# Patient Record
Sex: FEMALE | Race: WHITE | Employment: OTHER | URBAN - METROPOLITAN AREA
[De-identification: names, ages, dates, MRNs, and addresses within clinical notes are randomized per-mention and may not be internally consistent; named-entity substitution may affect disease eponyms.]

---

## 2017-09-12 RX ORDER — PROPRANOLOL HYDROCHLORIDE 20 MG/1
TABLET ORAL
Qty: 180 TABLET | Refills: 0 | Status: SHIPPED | OUTPATIENT
Start: 2017-09-12 | End: 2017-12-11

## 2017-09-12 NOTE — TELEPHONE ENCOUNTER
Please call Pt and advise that Pt is due for annual wellness exam. LOV 09/12/16. Routed to [facility].

## 2017-10-10 ENCOUNTER — TELEPHONE (OUTPATIENT)
Dept: FAMILY MEDICINE CLINIC | Facility: CLINIC | Age: 77
End: 2017-10-10

## 2017-10-10 NOTE — TELEPHONE ENCOUNTER
JONI w/spouse for pt to either call back or be here 15 min prior to answer her Annual Assessment Form for appt w/Dr Toñito Christine on 10/16/17 (form by daily checklist)

## 2017-10-16 ENCOUNTER — TELEPHONE (OUTPATIENT)
Dept: FAMILY MEDICINE CLINIC | Facility: CLINIC | Age: 77
End: 2017-10-16

## 2017-10-16 ENCOUNTER — APPOINTMENT (OUTPATIENT)
Dept: LAB | Age: 77
End: 2017-10-16
Attending: FAMILY MEDICINE
Payer: MEDICARE

## 2017-10-16 ENCOUNTER — OFFICE VISIT (OUTPATIENT)
Dept: FAMILY MEDICINE CLINIC | Facility: CLINIC | Age: 77
End: 2017-10-16

## 2017-10-16 VITALS
RESPIRATION RATE: 11 BRPM | DIASTOLIC BLOOD PRESSURE: 60 MMHG | SYSTOLIC BLOOD PRESSURE: 112 MMHG | HEIGHT: 64.5 IN | WEIGHT: 143 LBS | BODY MASS INDEX: 24.12 KG/M2 | HEART RATE: 84 BPM

## 2017-10-16 DIAGNOSIS — E78.00 PURE HYPERCHOLESTEROLEMIA: ICD-10-CM

## 2017-10-16 DIAGNOSIS — I34.1 MVP (MITRAL VALVE PROLAPSE): ICD-10-CM

## 2017-10-16 DIAGNOSIS — Z13.31 DEPRESSION SCREENING: ICD-10-CM

## 2017-10-16 DIAGNOSIS — C18.2 MALIGNANT NEOPLASM OF ASCENDING COLON (HCC): ICD-10-CM

## 2017-10-16 DIAGNOSIS — Z12.11 SCREEN FOR COLON CANCER: ICD-10-CM

## 2017-10-16 DIAGNOSIS — Z00.00 ENCOUNTER FOR ANNUAL HEALTH EXAMINATION: Primary | ICD-10-CM

## 2017-10-16 DIAGNOSIS — R91.1 LUNG NODULE: ICD-10-CM

## 2017-10-16 PROCEDURE — 99213 OFFICE O/P EST LOW 20 MIN: CPT | Performed by: FAMILY MEDICINE

## 2017-10-16 PROCEDURE — G0444 DEPRESSION SCREEN ANNUAL: HCPCS | Performed by: FAMILY MEDICINE

## 2017-10-16 PROCEDURE — 80053 COMPREHEN METABOLIC PANEL: CPT | Performed by: FAMILY MEDICINE

## 2017-10-16 PROCEDURE — 96160 PT-FOCUSED HLTH RISK ASSMT: CPT | Performed by: FAMILY MEDICINE

## 2017-10-16 PROCEDURE — 80061 LIPID PANEL: CPT | Performed by: FAMILY MEDICINE

## 2017-10-16 PROCEDURE — G0439 PPPS, SUBSEQ VISIT: HCPCS | Performed by: FAMILY MEDICINE

## 2017-10-16 PROCEDURE — 36415 COLL VENOUS BLD VENIPUNCTURE: CPT | Performed by: FAMILY MEDICINE

## 2017-10-16 NOTE — TELEPHONE ENCOUNTER
Called and talked to patient she is refusing to see any GI for colonoscopy and check up at this time.  I told her that if she should change her mind the referral is already in place and just call for an appointment

## 2017-10-16 NOTE — PATIENT INSTRUCTIONS
Alysa Bae's SCREENING SCHEDULE   Tests on this list are recommended by your physician but may not be covered, or covered at this frequency, by your insurer. Please check with your insurance carrier before scheduling to verify coverage.    PREVENTATI aortic aneurysm screening (once between ages 73-68) IPPE only No results found for this or any previous visit.  Limited to patients who meet one of the following criteria:   • Men who are 73-68 years old and have smoked more than 100 cigarettes in their lif least age 76, and as needed after 75 There are no preventive care reminders to display for this patient.  Please get this Mammogram regularly   Immunizations      Influenza  Covered Annually   Orders placed or performed in visit on 01/29/16  -INFLUENZA VIRU available in [address])  [email or web address]  This link also has information from the [address] [place] [address] regarding Advance Directives.     Baldemar Bae's SCREENING SCHEDULE   Tests on this list are recommended by your physician but may not be indicated for medical reasons Electrocardiogram date Routine EKG is not a screening covered service except at the [place] to Medicare Visit    Abdominal aortic aneurysm screening (once between ages 73-68) IPPE only No results found for this or any previous Chlamydia  Annually if high risk No results found for: CHLAMYDIA No flowsheet data found.     Screening Mammogram      Mammogram    Recommend Annually to at least age 76, and as needed after 76 There are no preventive care reminders to display for this rani Directives. It also has the State forms available on it's website for anyone to review and print using their home computer and printer. (the forms are also available in [place]  [email or web address]  This link also has information from the [facility]

## 2017-10-16 NOTE — TELEPHONE ENCOUNTER
Patient would like to speak to nurse regarding GI referral she was given today. Patient states she does not want to see GI. Please contact to discuss.

## 2017-10-16 NOTE — PROGRESS NOTES
HPI:   Emmanuel Ceballos is a 64year old female who presents for a Medicare Subsequent Annual Wellness visit (Pt already had Initial Annual Wellness). Colon cancer:  Last colon 7/2014, repeat every 3 years. She sees Dr. Yadiel Zayas.   She does not want to 100 MG Oral Cap Take 1 Cap by mouth daily. Melatonin 10 MG Oral Tab Take 1 Tab by mouth every evening. Cyanocobalamin (VITAMIN B-12) 1000 MCG Oral Tab Take 1 Tab by mouth daily.       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 Discussed with patient and provided information          PLAN:  The patient indicates understanding of these issues and agrees to the plan. Return in 1 year (on 10/16/2018).      Moris Doty Scorin    Scoring Interpretation: 0 - 3 No Risk     Depression Screening (PHQ-2/PHQ-9): Over the LAST 2 WEEKS   Little interest or pleasure in doing things (over the last two weeks)?: Several days    Feeling down, depressed, or hopeless (over the last Ophthalmology Visit Annually: Diabetics, FHx Glaucoma, AA>50, > 65 No flowsheet data found. Bone Density Screening      Dexascan Every two years Last Dexa Scan:   XR DEXA BONE DENSITOMETRY (CPT=77080) 07/31/2014    No flowsheet data found. Annually No results found for: DIGOXIN, DIG, VALP No flowsheet data found. Diabetes      HgbA1C  Annually No results found for: A1C No flowsheet data found.     Creat/alb ratio  Annually      LDL  Annually LDL-CHOLESTEROL (mg/dL (calc))   Date Value   06

## 2017-10-19 PROBLEM — I34.1 MVP (MITRAL VALVE PROLAPSE): Status: ACTIVE | Noted: 2017-10-19

## 2017-11-01 RX ORDER — SIMVASTATIN 20 MG
TABLET ORAL
Qty: 90 TABLET | Refills: 3 | Status: SHIPPED | OUTPATIENT
Start: 2017-11-01 | End: 2018-10-28

## 2017-12-11 RX ORDER — PROPRANOLOL HYDROCHLORIDE 20 MG/1
TABLET ORAL
Qty: 180 TABLET | Refills: 0 | Status: SHIPPED | OUTPATIENT
Start: 2017-12-11 | End: 2018-03-11

## 2017-12-11 NOTE — TELEPHONE ENCOUNTER
Medication refilled per protocol.        Signed Prescriptions Disp Refills    PROPRANOLOL HCL 20 MG Oral Tab 180 tablet 0      Sig: TAKE 1 TABLET TWICE A DAY        Authorizing Provider: Sylvia Zuniga        Ordering User: Gurpreet Hash 1

## 2018-03-12 RX ORDER — PROPRANOLOL HYDROCHLORIDE 20 MG/1
TABLET ORAL
Qty: 180 TABLET | Refills: 0 | Status: SHIPPED | OUTPATIENT
Start: 2018-03-12 | End: 2018-06-08

## 2018-06-12 RX ORDER — PROPRANOLOL HYDROCHLORIDE 20 MG/1
TABLET ORAL
Qty: 180 TABLET | Refills: 0 | Status: SHIPPED | OUTPATIENT
Start: 2018-06-12 | End: 2018-09-07

## 2018-09-07 ENCOUNTER — TELEPHONE (OUTPATIENT)
Dept: FAMILY MEDICINE CLINIC | Facility: CLINIC | Age: 78
End: 2018-09-07

## 2018-09-07 DIAGNOSIS — E78.00 PURE HYPERCHOLESTEROLEMIA: Primary | ICD-10-CM

## 2018-09-11 RX ORDER — PROPRANOLOL HYDROCHLORIDE 20 MG/1
TABLET ORAL
Qty: 180 TABLET | Refills: 0 | Status: SHIPPED | OUTPATIENT
Start: 2018-09-11 | End: 2018-12-09

## 2018-09-11 NOTE — TELEPHONE ENCOUNTER
Pt is due for appt. LOV 10/161/7. Please call Pt and assist with scheduling. It can be her Medicare Supervisit if she wishes. Routed to front staff.

## 2018-09-11 NOTE — TELEPHONE ENCOUNTER
MA supervisit scheduled on 10/19/18. Can we place labs for THE [facility].

## 2018-10-15 ENCOUNTER — TELEPHONE (OUTPATIENT)
Dept: FAMILY MEDICINE CLINIC | Facility: CLINIC | Age: 78
End: 2018-10-15

## 2018-10-19 ENCOUNTER — OFFICE VISIT (OUTPATIENT)
Dept: FAMILY MEDICINE CLINIC | Facility: CLINIC | Age: 78
End: 2018-10-19
Payer: MEDICARE

## 2018-10-19 VITALS
WEIGHT: 144 LBS | HEIGHT: 65 IN | TEMPERATURE: 99 F | DIASTOLIC BLOOD PRESSURE: 66 MMHG | SYSTOLIC BLOOD PRESSURE: 126 MMHG | BODY MASS INDEX: 23.99 KG/M2 | HEART RATE: 72 BPM | RESPIRATION RATE: 16 BRPM

## 2018-10-19 DIAGNOSIS — C18.2 MALIGNANT NEOPLASM OF ASCENDING COLON (HCC): ICD-10-CM

## 2018-10-19 DIAGNOSIS — I34.1 MVP (MITRAL VALVE PROLAPSE): ICD-10-CM

## 2018-10-19 DIAGNOSIS — R41.3 MEMORY DEFICIT: ICD-10-CM

## 2018-10-19 DIAGNOSIS — E78.00 PURE HYPERCHOLESTEROLEMIA: ICD-10-CM

## 2018-10-19 DIAGNOSIS — Z95.828 PORT-A-CATH IN PLACE: ICD-10-CM

## 2018-10-19 DIAGNOSIS — Z00.00 ENCOUNTER FOR ANNUAL HEALTH EXAMINATION: Primary | ICD-10-CM

## 2018-10-19 DIAGNOSIS — R91.1 LUNG NODULE: ICD-10-CM

## 2018-10-19 PROCEDURE — G0438 PPPS, INITIAL VISIT: HCPCS | Performed by: FAMILY MEDICINE

## 2018-10-19 PROCEDURE — 96160 PT-FOCUSED HLTH RISK ASSMT: CPT | Performed by: FAMILY MEDICINE

## 2018-10-19 RX ORDER — MEMANTINE HYDROCHLORIDE 7 MG/1
7 CAPSULE, EXTENDED RELEASE ORAL DAILY
Qty: 90 CAPSULE | Refills: 0 | Status: SHIPPED | OUTPATIENT
Start: 2018-10-19 | End: 2018-12-30

## 2018-10-19 NOTE — PATIENT INSTRUCTIONS
Tammy Bae's SCREENING SCHEDULE   Tests on this list are recommended by your physician but may not be covered, or covered at this frequency, by your insurer. Please check with your insurance carrier before scheduling to verify coverage.    PREVENTATI screening (once between ages 73-68) IPPE only No results found for this or any previous visit.  Limited to patients who meet one of the following criteria:   • Men who are 73-68 years old and have smoked more than 100 cigarettes in their lifetime   • Anyone needed after 75 There are no preventive care reminders to display for this patient.  Please get this Mammogram regularly   Immunizations      Influenza  Covered Annually Orders placed or performed in visit on 01/29/16   • INFLUENZA VIRUS VACCINE, PRESERV FR [place])  [email or web address]  This link also has information from the [address] [place] [address] regarding Advance Directives.

## 2018-10-19 NOTE — PROGRESS NOTES
HPI:   Cathi Macias is a 76year old female who presents for a Medicare Subsequent Annual Wellness visit (Pt already had Initial Annual Wellness). Colon cancer:  Last colon 7/2014, repeat every 3 years. She sees Dr. Lawrence Ohara.   She does not want to 20 MG Oral Tab TAKE 1 TABLET NIGHTLY   Ascorbic Acid (VITAMIN C) 100 MG Oral Tab Take 1 Tab by mouth daily. Ginseng 100 MG Oral Cap Take 1 Cap by mouth daily. Melatonin 10 MG Oral Tab Take 1 Tab by mouth every evening.    Cyanocobalamin (VITAMIN B-12) 1 of this encounter: 144 lb.     Medicare Hearing Assessment  (Required for AWV/SWV)                Visual Acuity  Right Eye Visual Acuity: Uncorrected Right Eye Chart Acuity: 20/40   Left Eye Visual Acuity: Uncorrected Left Eye Chart Acuity: 20/40   Both Eye with patient and provided information      Healthcare Power gavin Solomon on file in Epic:    Liliam Yanez does not have a Power of  for [facility] on file in [address] [place] [address] Rd.  Discussed with patient and provided information          PLAN:  The patient indic Not at all    PHQ-2 SCORE: 0        Advance Directives     Do you have a healthcare power of ?: Yes    Do you have a living will?: Yes     Please go to \"Cognitive Assessment\" under Medicare Assessment section in Charting, test patient and documen Every 3 yrs age 21-68 or Pap+HPV every 5 yrs age 33-67, age 72 and older at high risk There are no preventive care reminders to display for this patient.  Update Health Maintenance if applicable    Chlamydia  Annually if high risk No results found for: [facility] (mg/dL)   Date Value   07/14/2014 110     LDL Cholesterol (mg/dL)   Date Value   10/16/2017 85    No flowsheet data found. Dilated Eye exam  Annually No flowsheet data found. No flowsheet data found.     COPD      Spirometry Testing Annually Spirometry

## 2018-10-25 ENCOUNTER — TELEPHONE (OUTPATIENT)
Dept: FAMILY MEDICINE CLINIC | Facility: CLINIC | Age: 78
End: 2018-10-25

## 2018-10-25 NOTE — TELEPHONE ENCOUNTER
Pt is calling and she is questioning why she was given a referral for Dr Santi Covington? Please advise. Routed to Dr Kady Bell.

## 2018-10-30 RX ORDER — SIMVASTATIN 20 MG
TABLET ORAL
Qty: 90 TABLET | Refills: 1 | Status: SHIPPED | OUTPATIENT
Start: 2018-10-30 | End: 2019-04-28

## 2018-11-09 ENCOUNTER — OFFICE VISIT (OUTPATIENT)
Dept: SURGERY | Facility: CLINIC | Age: 78
End: 2018-11-09
Payer: MEDICARE

## 2018-11-09 VITALS
BODY MASS INDEX: 24.32 KG/M2 | HEART RATE: 65 BPM | SYSTOLIC BLOOD PRESSURE: 118 MMHG | HEIGHT: 65 IN | DIASTOLIC BLOOD PRESSURE: 68 MMHG | WEIGHT: 146 LBS

## 2018-11-09 DIAGNOSIS — Z85.038 HISTORY OF COLON CANCER: ICD-10-CM

## 2018-11-09 DIAGNOSIS — Z95.828 PORT-A-CATH IN PLACE: Primary | ICD-10-CM

## 2018-11-09 PROCEDURE — 99203 OFFICE O/P NEW LOW 30 MIN: CPT | Performed by: SURGERY

## 2018-11-13 NOTE — H&P
New Patient Visit Note       Active Problems      1. Port-A-Cath in place    2. History of colon cancer        Chief Complaint   Patient presents with:   Other: referred by Dr. Ana Lilia Osorio fo  removal of PAC ( has it for 15 years)      Allergies  Kirby nelson aller Sexual activity: Not on file    Other Topics      Concerns:         Service: Not Asked        Blood Transfusions: Not Asked        Caffeine Concern: Yes          3 cups of tea daily        Occupational Exposure: Not Asked        Hobby Hazards: Not Skin: Negative for color change and rash. Neurological: Negative for dizziness, syncope and light-headedness. Hematological: Negative for adenopathy. Does not bruise/bleed easily.    Psychiatric/Behavioral: Negative for confusion, hallucinations and s damage/tear the lining of the subclavian vein. Leaving this port in place is not ideal but as Rafi Ivory has been asymptomatic from it for over 20 years, removal may cause undue damage.   Further imaging with CT chest or fluoroscopy with contrast through por

## 2018-12-14 RX ORDER — PROPRANOLOL HYDROCHLORIDE 20 MG/1
TABLET ORAL
Qty: 180 TABLET | Refills: 1 | Status: SHIPPED | OUTPATIENT
Start: 2018-12-14 | End: 2019-06-09

## 2018-12-14 NOTE — TELEPHONE ENCOUNTER
Requested Prescriptions     Pending Prescriptions Disp Refills   • PROPRANOLOL HCL 20 MG Oral Tab [Pharmacy Med Name: PROPRANOLOL HCL TABS 20MG] 180 tablet 0     Sig: TAKE 1 TABLET TWICE A DAY       LOV 10/19/2018     Appointment scheduled: Visit date not

## 2019-01-04 RX ORDER — MEMANTINE HYDROCHLORIDE 7 MG/1
CAPSULE, EXTENDED RELEASE ORAL
Qty: 90 CAPSULE | Refills: 3 | Status: SHIPPED | OUTPATIENT
Start: 2019-01-04 | End: 2019-10-21

## 2019-03-04 ENCOUNTER — TELEPHONE (OUTPATIENT)
Dept: FAMILY MEDICINE CLINIC | Facility: CLINIC | Age: 79
End: 2019-03-04

## 2019-04-28 DIAGNOSIS — E78.00 PURE HYPERCHOLESTEROLEMIA: Primary | ICD-10-CM

## 2019-05-01 NOTE — TELEPHONE ENCOUNTER
Cholesterol Medication Protocol Failed4/28 5:59 AM  - ALT < 80   - ALT resulted within past year   - Lipid panel within past 12 months   + Appointment within past 12 or next 3 months     Last labs 10/16/17, LOV 10/19/18    Left message advising Pt to get l

## 2019-05-02 ENCOUNTER — LAB ENCOUNTER (OUTPATIENT)
Dept: LAB | Age: 79
End: 2019-05-02
Attending: FAMILY MEDICINE
Payer: MEDICARE

## 2019-05-02 DIAGNOSIS — E78.00 PURE HYPERCHOLESTEROLEMIA: ICD-10-CM

## 2019-05-02 PROCEDURE — 83721 ASSAY OF BLOOD LIPOPROTEIN: CPT

## 2019-05-02 PROCEDURE — 80053 COMPREHEN METABOLIC PANEL: CPT

## 2019-05-02 PROCEDURE — 80061 LIPID PANEL: CPT

## 2019-05-03 RX ORDER — SIMVASTATIN 20 MG
TABLET ORAL
Qty: 90 TABLET | Refills: 1 | Status: SHIPPED | OUTPATIENT
Start: 2019-05-03 | End: 2019-10-21

## 2019-05-03 NOTE — TELEPHONE ENCOUNTER
Labs completed and reviewed by Dr Augustine Cox, Refilled Simvastatin 20mgs to mail order until next physical due Oct 2019,  #90 with one refill, no appts schedule at this time

## 2019-05-20 ENCOUNTER — TELEPHONE (OUTPATIENT)
Dept: FAMILY MEDICINE CLINIC | Facility: CLINIC | Age: 79
End: 2019-05-20

## 2019-05-20 DIAGNOSIS — E78.00 PURE HYPERCHOLESTEROLEMIA: Primary | ICD-10-CM

## 2019-05-20 NOTE — TELEPHONE ENCOUNTER
Please enter lab orders for the patient's upcoming physical appointment. Physical scheduled: 10/21/19     Preferred lab: [facility]     The patient has been notified to complete fasting labs prior to their physical appointment.

## 2019-06-12 NOTE — TELEPHONE ENCOUNTER
Refill request for Propranolol fails protocol because LOV was 10/19/18. Pt takes it for MVP. Future Appointments   Date Time Provider [facility] Carla   10/21/2019 11:00 AM Samreen Singer MD EM[facility] 3 [facility]     Routed to Dr Sharyle Aus.

## 2019-06-13 RX ORDER — PROPRANOLOL HYDROCHLORIDE 20 MG/1
TABLET ORAL
Qty: 180 TABLET | Refills: 0 | Status: SHIPPED | OUTPATIENT
Start: 2019-06-13 | End: 2019-09-05

## 2019-06-13 NOTE — TELEPHONE ENCOUNTER
Due for JOSE M in October. Please ensure she is scheduled prior to refilling this in Sept 2019. Thanks.

## 2019-09-05 ENCOUNTER — TELEPHONE (OUTPATIENT)
Dept: FAMILY MEDICINE CLINIC | Facility: CLINIC | Age: 79
End: 2019-09-05

## 2019-09-05 RX ORDER — PROPRANOLOL HYDROCHLORIDE 20 MG/1
20 TABLET ORAL 2 TIMES DAILY
Qty: 180 TABLET | Refills: 0 | Status: SHIPPED | OUTPATIENT
Start: 2019-09-05 | End: 2019-10-21

## 2019-09-05 NOTE — TELEPHONE ENCOUNTER
Patient called states script for Propranolol HCl is showing  asked if can get [facility]

## 2019-09-05 NOTE — TELEPHONE ENCOUNTER
Requested Prescriptions     Pending Prescriptions Disp Refills   • Propranolol HCl 20 MG Oral Tab 180 tablet 0     Sig: Take 1 tablet (20 mg total) by mouth 2 (two) times daily.      LOV 10/19/2018     Patient was asked to follow-up in: one year    Appointm

## 2019-09-19 ENCOUNTER — MA CHART PREP (OUTPATIENT)
Dept: FAMILY MEDICINE CLINIC | Facility: CLINIC | Age: 79
End: 2019-09-19

## 2019-10-15 ENCOUNTER — TELEPHONE (OUTPATIENT)
Dept: FAMILY MEDICINE CLINIC | Facility: CLINIC | Age: 79
End: 2019-10-15

## 2019-10-21 ENCOUNTER — OFFICE VISIT (OUTPATIENT)
Dept: FAMILY MEDICINE CLINIC | Facility: CLINIC | Age: 79
End: 2019-10-21
Payer: MEDICARE

## 2019-10-21 VITALS
DIASTOLIC BLOOD PRESSURE: 64 MMHG | HEIGHT: 65 IN | RESPIRATION RATE: 16 BRPM | BODY MASS INDEX: 24.16 KG/M2 | WEIGHT: 145 LBS | SYSTOLIC BLOOD PRESSURE: 134 MMHG | TEMPERATURE: 98 F | HEART RATE: 72 BPM

## 2019-10-21 DIAGNOSIS — R41.3 MEMORY DEFICIT: ICD-10-CM

## 2019-10-21 DIAGNOSIS — Z00.00 ENCOUNTER FOR ANNUAL HEALTH EXAMINATION: Primary | ICD-10-CM

## 2019-10-21 DIAGNOSIS — C18.2 MALIGNANT NEOPLASM OF ASCENDING COLON (HCC): ICD-10-CM

## 2019-10-21 DIAGNOSIS — Z12.11 COLON CANCER SCREENING: ICD-10-CM

## 2019-10-21 DIAGNOSIS — I34.1 MVP (MITRAL VALVE PROLAPSE): ICD-10-CM

## 2019-10-21 DIAGNOSIS — E78.00 PURE HYPERCHOLESTEROLEMIA: ICD-10-CM

## 2019-10-21 DIAGNOSIS — R91.1 LUNG NODULE: ICD-10-CM

## 2019-10-21 PROCEDURE — G0439 PPPS, SUBSEQ VISIT: HCPCS | Performed by: FAMILY MEDICINE

## 2019-10-21 PROCEDURE — 96160 PT-FOCUSED HLTH RISK ASSMT: CPT | Performed by: FAMILY MEDICINE

## 2019-10-21 PROCEDURE — 99397 PER PM REEVAL EST PAT 65+ YR: CPT | Performed by: FAMILY MEDICINE

## 2019-10-21 RX ORDER — PROPRANOLOL HYDROCHLORIDE 20 MG/1
20 TABLET ORAL 2 TIMES DAILY
Qty: 180 TABLET | Refills: 3 | Status: SHIPPED | OUTPATIENT
Start: 2019-10-21 | End: 2020-10-30

## 2019-10-21 RX ORDER — MEMANTINE HYDROCHLORIDE 7 MG/1
7 CAPSULE, EXTENDED RELEASE ORAL
Qty: 90 CAPSULE | Refills: 3 | Status: SHIPPED | OUTPATIENT
Start: 2019-10-21 | End: 2020-10-30

## 2019-10-21 RX ORDER — SIMVASTATIN 20 MG
TABLET ORAL
Qty: 90 TABLET | Refills: 3 | Status: SHIPPED | OUTPATIENT
Start: 2019-10-21 | End: 2020-10-30

## 2019-10-21 NOTE — PATIENT INSTRUCTIONS
Madelin Bae's SCREENING SCHEDULE   Tests on this list are recommended by your physician but may not be covered, or covered at this frequency, by your insurer. Please check with your insurance carrier before scheduling to verify coverage.    PREVENTATI medical reasons Electrocardiogram date Routine EKG is not a screening covered service except at the [place] to Medicare Visit    Abdominal aortic aneurysm screening (once between ages 73-68) IPPE only No results found for this or any previous visit.  Limite high risk No results found for: CHLAMYDIA No flowsheet data found. Screening Mammogram      Mammogram    Recommend Annually to at least age 76, and as needed after 76 There are no preventive care reminders to display for this patient.  Please get this [place] State forms available on it's website for anyone to review and print using their home computer and printer. (the forms are also available in [address])  [email or web address]  This link also has information from the [address] regarding A

## 2019-10-21 NOTE — PROGRESS NOTES
HPI:   Edmund Ybarra is a [de-identified]year old female who presents for a Medicare Subsequent Annual Wellness visit (Pt already had Initial Annual Wellness). Colon cancer:  Last colon 2014, told to repeat in 2-3 years. She has seen Dr. Mary Patten.   She has decl 13.5 05/17/2013    .0 05/17/2013        ALLERGIES:   She is allergic to codeine.     CURRENT MEDICATIONS:   simvastatin 20 MG Oral Tab, TAKE 1 TABLET NIGHTLY  Memantine HCl ER 7 MG Oral Capsule SR 24 Hr, Take 1 capsule (7 mg total) by mouth once ginny anemia  ENDOCRINE: denies thyroid history  ALL/ASTHMA: denies hx of allergy or asthma    EXAM:   /64   Pulse 72   Temp 97.9 °F (36.6 °C) (Oral)   Resp 16   Ht 65\"   Wt 145 lb (65.8 kg)   LMP 01/01/1981   BMI 24.13 kg/m²  Estimated body mass index is above  - EVALUATE & TREAT, GASTRO (INTERNAL)    4. Pure hypercholesterolemia  CPM  - simvastatin 20 MG Oral Tab; TAKE 1 TABLET NIGHTLY  Dispense: 90 tablet; Refill: 3    5. Lung nodule  Stable according to last CT. Pt declines further testing.     6. Memor Functional Status     Hearing Problems?: No    Vision Problems? : No    Difficulty walking?: No    Difficulty dressing or bathing?: No    Problems with daily activities? : No    Memory Problems?: No      Fall/Risk Assessment          Have you fallen in Physical Exam only, or if medically necessary Electrocardiogram date       Colorectal Cancer Screening      Colonoscopy Screen every 10 years Colonoscopy due on 10/03/2016 Update Health Maintenance if applicable    Flex Sigmoidoscopy Screen every 10 years 07/14/2014 4.0   06/27/2012 3.9    No flowsheet data found. Creatinine  Annually CREATININE (mg/dL)   Date Value   07/14/2014 0.36 (L)   06/27/2012 0.54 (L)     Creatinine (mg/dL)   Date Value   05/02/2019 0.57    No flowsheet data found.     BUN  Haleigh

## 2019-10-31 ENCOUNTER — OFFICE VISIT (OUTPATIENT)
Dept: FAMILY MEDICINE CLINIC | Facility: CLINIC | Age: 79
End: 2019-10-31
Payer: MEDICARE

## 2019-10-31 VITALS
WEIGHT: 144.81 LBS | RESPIRATION RATE: 20 BRPM | TEMPERATURE: 98 F | HEIGHT: 64.5 IN | BODY MASS INDEX: 24.42 KG/M2 | DIASTOLIC BLOOD PRESSURE: 64 MMHG | HEART RATE: 60 BPM | SYSTOLIC BLOOD PRESSURE: 128 MMHG

## 2019-10-31 DIAGNOSIS — R21 EXCORIATED RASH: Primary | ICD-10-CM

## 2019-10-31 PROCEDURE — 99213 OFFICE O/P EST LOW 20 MIN: CPT | Performed by: PHYSICIAN ASSISTANT

## 2019-10-31 RX ORDER — NYSTATIN 100000 U/G
1 CREAM TOPICAL 2 TIMES DAILY
Qty: 30 G | Refills: 0 | Status: SHIPPED | OUTPATIENT
Start: 2019-10-31

## 2019-10-31 NOTE — PROGRESS NOTES
Patient presents with:  Rash: Itchy rash on both buttock present a couple of weeks. HISTORY OF PRESENT ILLNESS  Magen Home is a [de-identified]year old female who presents for evaluation of rash.  Starting a few weeks ago, she started developing a rash on he Smoker        Packs/day: 0.00      Smokeless tobacco: Never Used    Alcohol use:  Yes      Alcohol/week: 11.7 - 17.5 standard drinks      Types: 14 - 21 Glasses of wine per week      Comment: 2-3 glasses of wine daily    Drug use: No       10/31/19  0924

## 2019-10-31 NOTE — PATIENT INSTRUCTIONS
Apply three creams together and place on entire area of buttocks and crease twice a day.    1. Triamcinolone cream- prescription steroid cream.  2. Nystatin cream- prescription anti-fungal cream.    You will need to  the third cream- a barrier cream.

## 2019-11-04 ENCOUNTER — OFFICE VISIT (OUTPATIENT)
Dept: FAMILY MEDICINE CLINIC | Facility: CLINIC | Age: 79
End: 2019-11-04
Payer: MEDICARE

## 2019-11-04 VITALS
SYSTOLIC BLOOD PRESSURE: 114 MMHG | HEART RATE: 64 BPM | BODY MASS INDEX: 24.37 KG/M2 | TEMPERATURE: 98 F | RESPIRATION RATE: 16 BRPM | WEIGHT: 144.5 LBS | DIASTOLIC BLOOD PRESSURE: 64 MMHG | HEIGHT: 64.5 IN

## 2019-11-04 DIAGNOSIS — R21 RASH: Primary | ICD-10-CM

## 2019-11-04 PROCEDURE — 99213 OFFICE O/P EST LOW 20 MIN: CPT | Performed by: NURSE PRACTITIONER

## 2019-11-04 NOTE — PROGRESS NOTES
Patient presents with:  Rash Skin Problem (integumentary): Rash is better      HPI:  Presents for follow up of visit on 10/31/19 for complaints of rash to buttocks.  Was prescribed triamcinolone and nystatin creams at that visit and instructed to use Desiti apparent distress  HEENT: Normocephalic and atraumatic. Cardiovascular: Normal rate, regular rhythm. No murmur. Pulmonary/Chest: No respiratory distress. Effort normal. Breath sounds clear bilaterally.  No wheezes, rhonchi or rales  Skin: Skin is warm

## 2020-02-07 ENCOUNTER — TELEPHONE (OUTPATIENT)
Dept: FAMILY MEDICINE CLINIC | Facility: CLINIC | Age: 80
End: 2020-02-07

## 2020-02-07 NOTE — TELEPHONE ENCOUNTER
LMOM asking patient to contact our office to schedule MA supervisit after 10/21/20 due to [facility] MA.

## 2020-10-14 DIAGNOSIS — E78.00 PURE HYPERCHOLESTEROLEMIA: ICD-10-CM

## 2020-10-30 ENCOUNTER — OFFICE VISIT (OUTPATIENT)
Dept: FAMILY MEDICINE CLINIC | Facility: CLINIC | Age: 80
End: 2020-10-30
Payer: MEDICARE

## 2020-10-30 VITALS
RESPIRATION RATE: 16 BRPM | HEART RATE: 68 BPM | BODY MASS INDEX: 24.62 KG/M2 | HEIGHT: 64.5 IN | DIASTOLIC BLOOD PRESSURE: 60 MMHG | TEMPERATURE: 97 F | SYSTOLIC BLOOD PRESSURE: 116 MMHG | WEIGHT: 146 LBS

## 2020-10-30 DIAGNOSIS — E78.00 PURE HYPERCHOLESTEROLEMIA: ICD-10-CM

## 2020-10-30 DIAGNOSIS — Z85.038 HX OF COLON CANCER, STAGE I: ICD-10-CM

## 2020-10-30 DIAGNOSIS — R91.1 LUNG NODULE: ICD-10-CM

## 2020-10-30 DIAGNOSIS — I34.1 MVP (MITRAL VALVE PROLAPSE): ICD-10-CM

## 2020-10-30 DIAGNOSIS — Z00.00 ENCOUNTER FOR ANNUAL HEALTH EXAMINATION: Primary | ICD-10-CM

## 2020-10-30 DIAGNOSIS — R41.3 MEMORY DEFICIT: ICD-10-CM

## 2020-10-30 PROCEDURE — 96160 PT-FOCUSED HLTH RISK ASSMT: CPT | Performed by: FAMILY MEDICINE

## 2020-10-30 PROCEDURE — 3008F BODY MASS INDEX DOCD: CPT | Performed by: FAMILY MEDICINE

## 2020-10-30 PROCEDURE — G0439 PPPS, SUBSEQ VISIT: HCPCS | Performed by: FAMILY MEDICINE

## 2020-10-30 PROCEDURE — 3078F DIAST BP <80 MM HG: CPT | Performed by: FAMILY MEDICINE

## 2020-10-30 PROCEDURE — 99397 PER PM REEVAL EST PAT 65+ YR: CPT | Performed by: FAMILY MEDICINE

## 2020-10-30 PROCEDURE — 3074F SYST BP LT 130 MM HG: CPT | Performed by: FAMILY MEDICINE

## 2020-10-30 RX ORDER — SIMVASTATIN 20 MG
TABLET ORAL
Qty: 90 TABLET | Refills: 3 | OUTPATIENT
Start: 2020-10-30

## 2020-10-30 RX ORDER — MEMANTINE HYDROCHLORIDE 7 MG/1
7 CAPSULE, EXTENDED RELEASE ORAL
Qty: 90 CAPSULE | Refills: 3 | Status: SHIPPED | OUTPATIENT
Start: 2020-10-30 | End: 2021-10-26

## 2020-10-30 RX ORDER — PROPRANOLOL HYDROCHLORIDE 20 MG/1
20 TABLET ORAL 2 TIMES DAILY
Qty: 180 TABLET | Refills: 3 | Status: SHIPPED | OUTPATIENT
Start: 2020-10-30 | End: 2021-10-25

## 2020-10-30 RX ORDER — SIMVASTATIN 20 MG
TABLET ORAL
Qty: 90 TABLET | Refills: 3 | Status: SHIPPED | OUTPATIENT
Start: 2020-10-30 | End: 2021-10-21

## 2020-10-30 NOTE — PATIENT INSTRUCTIONS
Wu Bae's SCREENING SCHEDULE   Tests on this list are recommended by your physician but may not be covered, or covered at this frequency, by your insurer. Please check with your insurance carrier before scheduling to verify coverage.    PREVENTATI medical reasons Electrocardiogram date Routine EKG is not a screening covered service except at the [place] to Medicare Visit    Abdominal aortic aneurysm screening (once between ages 73-68) IPPE only No results found for this or any previous visit.  Limite high risk No results found for: CHLAMYDIA No flowsheet data found. Screening Mammogram      Mammogram    Recommend Annually to at least age 76, and as needed after 76 There are no preventive care reminders to display for this patient.  Please get this [place] forms available on it's website for anyone to review and print using their home computer and printer. (the forms are also available in [place]  [email or web address]  This link also has information from the [address] regarding A

## 2020-10-30 NOTE — PROGRESS NOTES
HPI:   Ailyn Palmer is a 78year old female who presents for a Medicare Subsequent Annual Wellness visit (Pt already had Initial Annual Wellness). Colon cancer:  Last colon 2014, told to repeat in 2-3 years. She has seen Dr. Ruggeiro Last.   She has decl total) by mouth once daily. •  Propranolol HCl 20 MG Oral Tab, Take 1 tablet (20 mg total) by mouth 2 (two) times daily.     •  simvastatin 20 MG Oral Tab, TAKE 1 TABLET NIGHTLY    •  nystatin 970435 UNIT/GM External Cream, Apply 1 Application topically 146 lb (66.2 kg).     Medicare Hearing Assessment  (Required for AWV/SWV)       Hearing Screening    Screening Method: Whisper Test  Whisper Test Result: Fail              Visual Acuity  Right Eye Visual Acuity: Uncorrected Right Eye Chart Acuity: 20/40   L Epic. Discussed with patient and provided information      [address] [facility] on file in Epic:    Catalina Egan does not have a Power of  for [employer] on file in [address] [place] [address] Rd.  Discussed with patient and provided information          PLAN:  T Several days    PHQ-2 SCORE: 1        Advance Directives     Do you have a healthcare power of ?: No    Do you have a living will?: No     Please go to \"Cognitive Assessment\" under Medicare Assessment section in Charting, test patient and documen Dexascan Every two years Last Dexa Scan:   XR DEXA BONE DENSITOMETRY (CPT=77080) 07/31/2014    No flowsheet data found.     Pap and Pelvic      Pap: Every 3 yrs age 21-65 or Pap+HPV every 5 yrs age 33-67, age 72 and older at high risk There are no preven flowsheet data found.     Creat/alb ratio  Annually      LDL  Annually LDL Cholesterol (mg/dL)   Date Value   05/02/2019      Comment:     Unable to calculate LDL due to Triglycerides >400.0 mg/dL      Desirable <100 mg/dL   Borderline 100-129 mg/dL   High

## 2020-11-03 ENCOUNTER — LAB ENCOUNTER (OUTPATIENT)
Dept: LAB | Age: 80
End: 2020-11-03
Attending: FAMILY MEDICINE
Payer: MEDICARE

## 2020-11-03 DIAGNOSIS — E78.00 PURE HYPERCHOLESTEROLEMIA: ICD-10-CM

## 2020-11-03 PROCEDURE — 80061 LIPID PANEL: CPT

## 2020-11-03 PROCEDURE — 80053 COMPREHEN METABOLIC PANEL: CPT

## 2020-11-03 PROCEDURE — 36415 COLL VENOUS BLD VENIPUNCTURE: CPT

## 2021-03-18 ENCOUNTER — IMMUNIZATION (OUTPATIENT)
Dept: LAB | Age: 81
End: 2021-03-18
Attending: HOSPITALIST
Payer: MEDICARE

## 2021-03-18 DIAGNOSIS — Z23 NEED FOR VACCINATION: Primary | ICD-10-CM

## 2021-03-18 PROCEDURE — 0001A SARSCOV2 VAC 30MCG/0.3ML IM: CPT

## 2021-04-08 ENCOUNTER — IMMUNIZATION (OUTPATIENT)
Dept: LAB | Age: 81
End: 2021-04-08
Attending: HOSPITALIST
Payer: MEDICARE

## 2021-04-08 DIAGNOSIS — Z23 NEED FOR VACCINATION: Primary | ICD-10-CM

## 2021-04-08 PROCEDURE — 0002A SARSCOV2 VAC 30MCG/0.3ML IM: CPT

## 2021-04-28 DIAGNOSIS — R41.3 MEMORY DEFICIT: Primary | ICD-10-CM

## 2021-04-28 RX ORDER — MEMANTINE HYDROCHLORIDE 7 MG/1
7 CAPSULE, EXTENDED RELEASE ORAL DAILY
Qty: 30 CAPSULE | Refills: 0 | Status: SHIPPED | OUTPATIENT
Start: 2021-04-28 | End: 2021-10-26

## 2021-04-28 NOTE — TELEPHONE ENCOUNTER
Pt is calling she needs her Mematine HCI ER 7 mg [facility] and an emergency dose to [employer] [employer] [address]. She is completely out.     She has also seen Helena Lama NP  And ABBY Mueller  In 2019 and Dr. Ana Lilia Osorio in

## 2021-04-28 NOTE — TELEPHONE ENCOUNTER
Spoke with [facility]adfly[facility]. Noemi's Memantine is being sent out and should arrive within 5-7 days. She is completely out of the medication and is requesting a small supply to [facility].  is not available today.     Will you refill Dinora Landers

## 2021-07-30 ENCOUNTER — TELEPHONE (OUTPATIENT)
Dept: FAMILY MEDICINE CLINIC | Facility: CLINIC | Age: 81
End: 2021-07-30

## 2021-07-30 NOTE — TELEPHONE ENCOUNTER
Pt has a spot on her cheek that they noticed earlier this week and now it is bleeding and they are very concerned.

## 2021-07-30 NOTE — TELEPHONE ENCOUNTER
Has darker spot on chin noticed last week slight bleeding so made an appointment for 8/3/21 with Dr Ronald Meléndez

## 2021-08-03 ENCOUNTER — OFFICE VISIT (OUTPATIENT)
Dept: FAMILY MEDICINE CLINIC | Facility: CLINIC | Age: 81
End: 2021-08-03
Payer: MEDICARE

## 2021-08-03 VITALS
DIASTOLIC BLOOD PRESSURE: 64 MMHG | WEIGHT: 138 LBS | RESPIRATION RATE: 16 BRPM | BODY MASS INDEX: 23.27 KG/M2 | TEMPERATURE: 98 F | HEIGHT: 64.5 IN | HEART RATE: 60 BPM | SYSTOLIC BLOOD PRESSURE: 110 MMHG

## 2021-08-03 DIAGNOSIS — L98.9 SKIN LESION OF FACE: Primary | ICD-10-CM

## 2021-08-03 PROCEDURE — 3008F BODY MASS INDEX DOCD: CPT | Performed by: FAMILY MEDICINE

## 2021-08-03 PROCEDURE — 3078F DIAST BP <80 MM HG: CPT | Performed by: FAMILY MEDICINE

## 2021-08-03 PROCEDURE — 3074F SYST BP LT 130 MM HG: CPT | Performed by: FAMILY MEDICINE

## 2021-08-03 PROCEDURE — 99213 OFFICE O/P EST LOW 20 MIN: CPT | Performed by: FAMILY MEDICINE

## 2021-10-21 ENCOUNTER — TELEPHONE (OUTPATIENT)
Dept: FAMILY MEDICINE CLINIC | Facility: CLINIC | Age: 81
End: 2021-10-21

## 2021-10-21 DIAGNOSIS — E78.00 PURE HYPERCHOLESTEROLEMIA: ICD-10-CM

## 2021-10-21 RX ORDER — SIMVASTATIN 20 MG
TABLET ORAL
Qty: 90 TABLET | Refills: 3 | Status: SHIPPED | OUTPATIENT
Start: 2021-10-21

## 2021-10-21 NOTE — TELEPHONE ENCOUNTER
Pt is calling for her Simvastatin to be refilled please send to [facility] not [facility]. Patient is out of the medication.

## 2021-10-25 RX ORDER — PROPRANOLOL HYDROCHLORIDE 20 MG/1
TABLET ORAL
Qty: 180 TABLET | Refills: 3 | Status: SHIPPED | OUTPATIENT
Start: 2021-10-25

## 2021-10-25 NOTE — TELEPHONE ENCOUNTER
Refill request for:    Requested Prescriptions     Pending Prescriptions Disp Refills   • PROPRANOLOL 20 MG Oral Tab [Pharmacy Med Name: PROPRANOLOL HCL TABS 20MG] 180 tablet 3     Sig: TAKE 1 TABLET TWICE A DAY        Last Prescribed Quantity Refills   10

## 2021-10-26 ENCOUNTER — OFFICE VISIT (OUTPATIENT)
Dept: FAMILY MEDICINE CLINIC | Facility: CLINIC | Age: 81
End: 2021-10-26
Payer: MEDICARE

## 2021-10-26 VITALS
RESPIRATION RATE: 16 BRPM | SYSTOLIC BLOOD PRESSURE: 104 MMHG | HEART RATE: 72 BPM | TEMPERATURE: 99 F | HEIGHT: 64.5 IN | WEIGHT: 139.38 LBS | BODY MASS INDEX: 23.51 KG/M2 | DIASTOLIC BLOOD PRESSURE: 60 MMHG

## 2021-10-26 DIAGNOSIS — Z85.038 HX OF COLON CANCER, STAGE I: ICD-10-CM

## 2021-10-26 DIAGNOSIS — I34.1 MVP (MITRAL VALVE PROLAPSE): ICD-10-CM

## 2021-10-26 DIAGNOSIS — R41.3 MEMORY DEFICIT: ICD-10-CM

## 2021-10-26 DIAGNOSIS — R91.1 LUNG NODULE: ICD-10-CM

## 2021-10-26 DIAGNOSIS — Z00.00 ENCOUNTER FOR ANNUAL HEALTH EXAMINATION: Primary | ICD-10-CM

## 2021-10-26 DIAGNOSIS — E78.00 PURE HYPERCHOLESTEROLEMIA: ICD-10-CM

## 2021-10-26 PROCEDURE — 3078F DIAST BP <80 MM HG: CPT | Performed by: FAMILY MEDICINE

## 2021-10-26 PROCEDURE — 96160 PT-FOCUSED HLTH RISK ASSMT: CPT | Performed by: FAMILY MEDICINE

## 2021-10-26 PROCEDURE — 3008F BODY MASS INDEX DOCD: CPT | Performed by: FAMILY MEDICINE

## 2021-10-26 PROCEDURE — 99397 PER PM REEVAL EST PAT 65+ YR: CPT | Performed by: FAMILY MEDICINE

## 2021-10-26 PROCEDURE — G0439 PPPS, SUBSEQ VISIT: HCPCS | Performed by: FAMILY MEDICINE

## 2021-10-26 PROCEDURE — 3074F SYST BP LT 130 MM HG: CPT | Performed by: FAMILY MEDICINE

## 2021-10-26 RX ORDER — MEMANTINE HYDROCHLORIDE 7 MG/1
7 CAPSULE, EXTENDED RELEASE ORAL
Qty: 90 CAPSULE | Refills: 3 | Status: SHIPPED | OUTPATIENT
Start: 2021-10-26

## 2021-10-26 NOTE — PATIENT INSTRUCTIONS
Osmani Bae's SCREENING SCHEDULE   Tests on this list are recommended by your physician but may not be covered, or covered at this frequency, by your insurer. Please check with your insurance carrier before scheduling to verify coverage.    Tabatha Garibay (CPT=77080) 07/31/2014      No recommendations at this time   Pap and Pelvic    Pap   Covered every 2 years for women at normal risk;  Annually if at high risk -  No recommendations at this time    Chlamydia Annually if high risk -  No recommendations at th Advance Directives.

## 2021-10-26 NOTE — PROGRESS NOTES
HPI:   Isi Fitzgerald is a 78year old female who presents for a Medicare Subsequent Annual Wellness visit (Pt already had Initial Annual Wellness). Colon cancer:  Last colon 2014, told to repeat in 2-3 years. She has seen Dr. Wali Mejia.   Each year pa TABLET NIGHTLY  Ascorbic Acid (VITAMIN C) 100 MG Oral Tab, Take 1 Tab by mouth daily. Ginseng 100 MG Oral Cap, Take 1 Cap by mouth daily. Melatonin 10 MG Oral Tab, Take 1 Tab by mouth every evening.   Cyanocobalamin (VITAMIN B-12) 1000 MCG Oral Tab, Take Eye Visual Acuity: Uncorrected Left Eye Chart Acuity: 20/40   Both Eyes Visual Acuity: Uncorrected Both Eyes Chart Acuity: 20/40   Able To Tolerate Visual Acuity: Yes      /60   Pulse 72   Temp 99 °F (37.2 °C) (Tympanic)   Resp 16   Ht 5' 4.5\" (1.63 months, have you lost more than 10 pounds without trying?: 2 - No    Has your appetite been poor?: No    How does the patient maintain a good energy level?: Daily Walks    How would you describe your daily physical activity?: Moderate    How would you desc Internal Lab or Procedure External Lab or Procedure   Diabetes Screening      HbgA1C   Annually No results found for: A1C No flowsheet data found.     Fasting Blood Sugar (FSB)Annually Glucose (mg/dL)   Date Value   11/03/2020 103 (H)     GLUCOSE (mg/dL) previous visit. Hepatitis B No orders found for this or any previous visit. Tetanus No orders found for this or any previous visit.          SPECIFIC DISEASE MONITORING Internal Lab or Procedure External Lab or Procedure   Annual Monitoring of Persi

## 2021-11-01 RX ORDER — MEMANTINE HYDROCHLORIDE 7 MG/1
CAPSULE, EXTENDED RELEASE ORAL
Qty: 90 CAPSULE | Refills: 3 | OUTPATIENT
Start: 2021-11-01

## 2021-11-01 NOTE — TELEPHONE ENCOUNTER
Refill request for:    Requested Prescriptions     Pending Prescriptions Disp Refills   • MEMANTINE HCL ER 7 MG Oral Capsule SR 24 Hr [Pharmacy Med Name: MEMANTINE HCL ER CAPS 7MG] 90 capsule 3     Sig: TAKE 1 CAPSULE DAILY        Last Prescribed Quantity

## 2022-03-30 ENCOUNTER — TELEPHONE (OUTPATIENT)
Dept: FAMILY MEDICINE CLINIC | Facility: CLINIC | Age: 82
End: 2022-03-30

## 2022-03-30 NOTE — TELEPHONE ENCOUNTER
Pt calling. States she is having issues with her right ear. Says it's \"plugged up\", possibly ear wax. Pt would like to be seen today bot no availability. Pt asking what is she to do for this.

## 2022-04-18 NOTE — TELEPHONE ENCOUNTER
Refilled medication per protocol Staged Advancement Flap Text: The defect edges were debeveled with a #15 scalpel blade.  Given the location of the defect, shape of the defect and the proximity to free margins a staged advancement flap was deemed most appropriate.  Using a sterile surgical marker, an appropriate advancement flap was drawn incorporating the defect and placing the expected incisions within the relaxed skin tension lines where possible. The area thus outlined was incised deep to adipose tissue with a #15 scalpel blade.  The skin margins were undermined to an appropriate distance in all directions utilizing iris scissors.

## 2022-05-06 ENCOUNTER — TELEPHONE (OUTPATIENT)
Dept: FAMILY MEDICINE CLINIC | Facility: CLINIC | Age: 82
End: 2022-05-06

## 2022-05-06 NOTE — TELEPHONE ENCOUNTER
Pt's spouse is calling he said his wife is out of her Atorvastatin 20 mg. He said that Dr. Ino Hirsch changed it from Simvastatin.  Please send to [place]

## 2022-05-06 NOTE — TELEPHONE ENCOUNTER
states he spoke with Dr. Jany Luis a week to 10 days ago and was told pt should switch from simvastatin to atorvastatin. Chart reviewed - do not see record of this conversation. No record of atorvastatin ever being prescribed. Routed to Dr. Jany Luis - can you confirm which med pt should be taking?

## 2022-05-09 NOTE — TELEPHONE ENCOUNTER
There's some confusion.  , Marquis Perez, is on Atorvastatin. Tiana Snyder is on simvastatin. Marquis Perez called in for himself on 5/2 - please see message. I did not change Noemi's medication.

## 2022-05-14 RX ORDER — SIMVASTATIN 20 MG
TABLET ORAL
Qty: 90 TABLET | Refills: 3 | Status: SHIPPED | OUTPATIENT
Start: 2022-05-14

## 2022-05-14 NOTE — TELEPHONE ENCOUNTER
Patient  called back told him we did not change the medication he [facility].  I sent in new prescription

## 2022-08-02 RX ORDER — MEMANTINE HYDROCHLORIDE 7 MG/1
7 CAPSULE, EXTENDED RELEASE ORAL
Qty: 90 CAPSULE | Refills: 3 | OUTPATIENT
Start: 2022-08-02

## 2022-08-05 RX ORDER — MEMANTINE HYDROCHLORIDE 7 MG/1
7 CAPSULE, EXTENDED RELEASE ORAL
Qty: 90 CAPSULE | Refills: 3 | Status: SHIPPED | OUTPATIENT
Start: 2022-08-05

## 2022-10-21 RX ORDER — MEMANTINE HYDROCHLORIDE 7 MG/1
CAPSULE, EXTENDED RELEASE ORAL
Qty: 90 CAPSULE | Refills: 3 | Status: SHIPPED | OUTPATIENT
Start: 2022-10-21

## 2022-10-21 RX ORDER — PROPRANOLOL HYDROCHLORIDE 20 MG/1
TABLET ORAL
Qty: 180 TABLET | Refills: 3 | Status: SHIPPED | OUTPATIENT
Start: 2022-10-21

## 2022-11-25 ENCOUNTER — TELEPHONE (OUTPATIENT)
Dept: FAMILY MEDICINE CLINIC | Facility: CLINIC | Age: 82
End: 2022-11-25

## 2022-11-25 RX ORDER — PROPRANOLOL HYDROCHLORIDE 20 MG/1
20 TABLET ORAL 2 TIMES DAILY
Qty: 20 TABLET | Refills: 0 | Status: ON HOLD | OUTPATIENT
Start: 2022-11-25

## 2022-11-25 NOTE — TELEPHONE ENCOUNTER
Patient's spouse is calling his wife's Propanolol has been lost in the mail, [facility] is sending a new supply but they will not get it for 10 days. please send a 10 supply to [facility].  Please send today she is out of the medication

## 2022-11-26 ENCOUNTER — HOSPITAL ENCOUNTER (INPATIENT)
Facility: HOSPITAL | Age: 82
LOS: 10 days | Discharge: HOME | End: 2022-12-06
Attending: STUDENT IN AN ORGANIZED HEALTH CARE EDUCATION/TRAINING PROGRAM | Admitting: HOSPITALIST
Payer: MEDICARE

## 2022-11-26 ENCOUNTER — APPOINTMENT (OUTPATIENT)
Dept: CT IMAGING | Facility: HOSPITAL | Age: 82
DRG: 065 | End: 2022-11-26
Attending: STUDENT IN AN ORGANIZED HEALTH CARE EDUCATION/TRAINING PROGRAM
Payer: MEDICARE

## 2022-11-26 ENCOUNTER — APPOINTMENT (OUTPATIENT)
Dept: CT IMAGING | Facility: HOSPITAL | Age: 82
End: 2022-11-26
Attending: STUDENT IN AN ORGANIZED HEALTH CARE EDUCATION/TRAINING PROGRAM
Payer: MEDICARE

## 2022-11-26 ENCOUNTER — HOSPITAL ENCOUNTER (INPATIENT)
Facility: HOSPITAL | Age: 82
LOS: 10 days | Discharge: NURSING, REHAB OR LONG-TERM CARE | DRG: 065 | End: 2022-12-06
Attending: STUDENT IN AN ORGANIZED HEALTH CARE EDUCATION/TRAINING PROGRAM | Admitting: HOSPITALIST
Payer: MEDICARE

## 2022-11-26 DIAGNOSIS — I63.9 ACUTE CVA (CEREBROVASCULAR ACCIDENT) (HCC): Primary | ICD-10-CM

## 2022-11-26 PROBLEM — R73.9 HYPERGLYCEMIA: Status: ACTIVE | Noted: 2022-11-26

## 2022-11-26 LAB
ALBUMIN SERPL-MCNC: 3.6 G/DL (ref 3.4–5)
ALBUMIN/GLOB SERPL: 0.9 {RATIO} (ref 1–2)
ALP LIVER SERPL-CCNC: 64 U/L
ALT SERPL-CCNC: 20 U/L
ANION GAP SERPL CALC-SCNC: 5 MMOL/L (ref 0–18)
APTT PPP: 25.9 SECONDS (ref 23.3–35.6)
AST SERPL-CCNC: 16 U/L (ref 15–37)
BASOPHILS # BLD AUTO: 0.04 X10(3) UL (ref 0–0.2)
BASOPHILS NFR BLD AUTO: 0.6 %
BILIRUB SERPL-MCNC: 1.2 MG/DL (ref 0.1–2)
BUN BLD-MCNC: 15 MG/DL (ref 7–18)
CALCIUM BLD-MCNC: 9.5 MG/DL (ref 8.5–10.1)
CHLORIDE SERPL-SCNC: 112 MMOL/L (ref 98–112)
CO2 SERPL-SCNC: 25 MMOL/L (ref 21–32)
CREAT BLD-MCNC: 0.59 MG/DL
EOSINOPHIL # BLD AUTO: 0.11 X10(3) UL (ref 0–0.7)
EOSINOPHIL NFR BLD AUTO: 1.6 %
ERYTHROCYTE [DISTWIDTH] IN BLOOD BY AUTOMATED COUNT: 13 %
EST. AVERAGE GLUCOSE BLD GHB EST-MCNC: 100 MG/DL (ref 68–126)
GFR SERPLBLD BASED ON 1.73 SQ M-ARVRAT: 89 ML/MIN/1.73M2 (ref 60–?)
GLOBULIN PLAS-MCNC: 4 G/DL (ref 2.8–4.4)
GLUCOSE BLD-MCNC: 102 MG/DL (ref 70–99)
GLUCOSE BLD-MCNC: 102 MG/DL (ref 70–99)
GLUCOSE BLD-MCNC: 107 MG/DL (ref 70–99)
HBA1C MFR BLD: 5.1 % (ref ?–5.7)
HCT VFR BLD AUTO: 44.9 %
HGB BLD-MCNC: 14.8 G/DL
IMM GRANULOCYTES # BLD AUTO: 0.02 X10(3) UL (ref 0–1)
IMM GRANULOCYTES NFR BLD: 0.3 %
INR BLD: 1.01 (ref 0.85–1.16)
LYMPHOCYTES # BLD AUTO: 1.55 X10(3) UL (ref 1–4)
LYMPHOCYTES NFR BLD AUTO: 22.9 %
MCH RBC QN AUTO: 31.9 PG (ref 26–34)
MCHC RBC AUTO-ENTMCNC: 33 G/DL (ref 31–37)
MCV RBC AUTO: 96.8 FL
MONOCYTES # BLD AUTO: 0.59 X10(3) UL (ref 0.1–1)
MONOCYTES NFR BLD AUTO: 8.7 %
NEUTROPHILS # BLD AUTO: 4.46 X10 (3) UL (ref 1.5–7.7)
NEUTROPHILS # BLD AUTO: 4.46 X10(3) UL (ref 1.5–7.7)
NEUTROPHILS NFR BLD AUTO: 65.9 %
OSMOLALITY SERPL CALC.SUM OF ELEC: 295 MOSM/KG (ref 275–295)
PLATELET # BLD AUTO: 175 10(3)UL (ref 150–450)
POTASSIUM SERPL-SCNC: 3.6 MMOL/L (ref 3.5–5.1)
PROT SERPL-MCNC: 7.6 G/DL (ref 6.4–8.2)
PROTHROMBIN TIME: 13.3 SECONDS (ref 11.6–14.8)
RBC # BLD AUTO: 4.64 X10(6)UL
SARS-COV-2 RNA RESP QL NAA+PROBE: NOT DETECTED
SODIUM SERPL-SCNC: 142 MMOL/L (ref 136–145)
WBC # BLD AUTO: 6.8 X10(3) UL (ref 4–11)

## 2022-11-26 PROCEDURE — 70450 CT HEAD/BRAIN W/O DYE: CPT | Performed by: STUDENT IN AN ORGANIZED HEALTH CARE EDUCATION/TRAINING PROGRAM

## 2022-11-26 PROCEDURE — 99223 1ST HOSP IP/OBS HIGH 75: CPT | Performed by: INTERNAL MEDICINE

## 2022-11-26 RX ORDER — HYDRALAZINE HYDROCHLORIDE 20 MG/ML
10 INJECTION INTRAMUSCULAR; INTRAVENOUS EVERY 2 HOUR PRN
Status: DISCONTINUED | OUTPATIENT
Start: 2022-11-26 | End: 2022-12-06

## 2022-11-26 RX ORDER — LABETALOL HYDROCHLORIDE 5 MG/ML
10 INJECTION, SOLUTION INTRAVENOUS EVERY 10 MIN PRN
Status: COMPLETED | OUTPATIENT
Start: 2022-11-26 | End: 2022-11-26

## 2022-11-26 RX ORDER — SODIUM CHLORIDE 9 MG/ML
INJECTION, SOLUTION INTRAVENOUS CONTINUOUS
Status: ACTIVE | OUTPATIENT
Start: 2022-11-26 | End: 2022-11-29

## 2022-11-26 RX ORDER — METOCLOPRAMIDE HYDROCHLORIDE 5 MG/ML
10 INJECTION INTRAMUSCULAR; INTRAVENOUS EVERY 8 HOURS PRN
Status: DISCONTINUED | OUTPATIENT
Start: 2022-11-26 | End: 2022-11-27

## 2022-11-26 RX ORDER — POLYETHYLENE GLYCOL 3350 17 G/17G
17 POWDER, FOR SOLUTION ORAL DAILY PRN
Status: DISCONTINUED | OUTPATIENT
Start: 2022-11-26 | End: 2022-12-06

## 2022-11-26 RX ORDER — ACETAMINOPHEN 325 MG/1
650 TABLET ORAL EVERY 4 HOURS PRN
Status: DISCONTINUED | OUTPATIENT
Start: 2022-11-26 | End: 2022-12-06

## 2022-11-26 RX ORDER — SODIUM PHOSPHATE, DIBASIC AND SODIUM PHOSPHATE, MONOBASIC 7; 19 G/133ML; G/133ML
1 ENEMA RECTAL ONCE AS NEEDED
Status: DISCONTINUED | OUTPATIENT
Start: 2022-11-26 | End: 2022-12-06

## 2022-11-26 RX ORDER — ACETAMINOPHEN 650 MG/1
650 SUPPOSITORY RECTAL EVERY 4 HOURS PRN
Status: DISCONTINUED | OUTPATIENT
Start: 2022-11-26 | End: 2022-12-06

## 2022-11-26 RX ORDER — ASPIRIN 325 MG
325 TABLET ORAL DAILY
Status: DISCONTINUED | OUTPATIENT
Start: 2022-11-26 | End: 2022-12-06

## 2022-11-26 RX ORDER — HEPARIN SODIUM 5000 [USP'U]/ML
5000 INJECTION, SOLUTION INTRAVENOUS; SUBCUTANEOUS EVERY 8 HOURS SCHEDULED
Status: DISCONTINUED | OUTPATIENT
Start: 2022-11-26 | End: 2022-12-06

## 2022-11-26 RX ORDER — MELATONIN
3 NIGHTLY PRN
Status: DISCONTINUED | OUTPATIENT
Start: 2022-11-26 | End: 2022-12-06

## 2022-11-26 RX ORDER — HYDRALAZINE HYDROCHLORIDE 50 MG/1
50 TABLET, FILM COATED ORAL ONCE
Status: COMPLETED | OUTPATIENT
Start: 2022-11-26 | End: 2022-11-26

## 2022-11-26 RX ORDER — BISACODYL 10 MG
10 SUPPOSITORY, RECTAL RECTAL
Status: DISCONTINUED | OUTPATIENT
Start: 2022-11-26 | End: 2022-12-06

## 2022-11-26 RX ORDER — ASPIRIN 300 MG/1
300 SUPPOSITORY RECTAL DAILY
Status: DISCONTINUED | OUTPATIENT
Start: 2022-11-26 | End: 2022-12-06

## 2022-11-26 RX ORDER — OSELTAMIVIR PHOSPHATE 75 MG/1
75 CAPSULE ORAL EVERY 12 HOURS SCHEDULED
Status: DISCONTINUED | OUTPATIENT
Start: 2022-11-26 | End: 2022-11-28

## 2022-11-26 RX ORDER — ONDANSETRON 2 MG/ML
4 INJECTION INTRAMUSCULAR; INTRAVENOUS EVERY 6 HOURS PRN
Status: DISCONTINUED | OUTPATIENT
Start: 2022-11-26 | End: 2022-12-06

## 2022-11-26 RX ORDER — SENNOSIDES 8.6 MG
17.2 TABLET ORAL NIGHTLY PRN
Status: DISCONTINUED | OUTPATIENT
Start: 2022-11-26 | End: 2022-12-06

## 2022-11-26 NOTE — ED INITIAL ASSESSMENT (HPI)
Patient presents to the ED with c/o dizziness that started yesterday. Right arm/hand seems to be weak compared to her left side. FAST exam done, positive for weakness on the right arm; the rest of the exam was negative. No facial droop or slurring of speech.  states that patient's baseline is A&Ox2, she has been more confused over the last year or so, he states that nothing seems new over the last 24 hours.

## 2022-11-26 NOTE — ED QUICK NOTES
Orders for admission, patient is aware of plan and ready to go upstairs. Any questions, please call ED RN Summer at extension [phone].      Patient Covid vaccination status: Fully vaccinated     COVID Test Ordered in ED: Rapid SARS-CoV-2 by PCR    COVID Suspicion at Admission: No risk with negative rapid    Running Infusions:  None    Mental Status/LOC at time of transport: A&Ox1    Other pertinent information: orientation status is not new, R arm weakness is new  CIWA score: N/A   NIH score:  2

## 2022-11-27 LAB
ANION GAP SERPL CALC-SCNC: 11 MMOL/L (ref 0–18)
ATRIAL RATE: 56 BPM
BASOPHILS # BLD AUTO: 0.02 X10(3) UL (ref 0–0.2)
BASOPHILS NFR BLD AUTO: 0.2 %
BUN BLD-MCNC: 14 MG/DL (ref 7–18)
CALCIUM BLD-MCNC: 9.7 MG/DL (ref 8.5–10.1)
CHLORIDE SERPL-SCNC: 109 MMOL/L (ref 98–112)
CO2 SERPL-SCNC: 21 MMOL/L (ref 21–32)
CREAT BLD-MCNC: 0.45 MG/DL
EOSINOPHIL # BLD AUTO: 0.01 X10(3) UL (ref 0–0.7)
EOSINOPHIL NFR BLD AUTO: 0.1 %
ERYTHROCYTE [DISTWIDTH] IN BLOOD BY AUTOMATED COUNT: 12.9 %
GFR SERPLBLD BASED ON 1.73 SQ M-ARVRAT: 95 ML/MIN/1.73M2 (ref 60–?)
GLUCOSE BLD-MCNC: 102 MG/DL (ref 70–99)
GLUCOSE BLD-MCNC: 106 MG/DL (ref 70–99)
GLUCOSE BLD-MCNC: 108 MG/DL (ref 70–99)
GLUCOSE BLD-MCNC: 117 MG/DL (ref 70–99)
GLUCOSE BLD-MCNC: 92 MG/DL (ref 70–99)
HCT VFR BLD AUTO: 46.6 %
HGB BLD-MCNC: 15.8 G/DL
IMM GRANULOCYTES # BLD AUTO: 0.03 X10(3) UL (ref 0–1)
IMM GRANULOCYTES NFR BLD: 0.3 %
LYMPHOCYTES # BLD AUTO: 1.04 X10(3) UL (ref 1–4)
LYMPHOCYTES NFR BLD AUTO: 10.3 %
MCH RBC QN AUTO: 32.5 PG (ref 26–34)
MCHC RBC AUTO-ENTMCNC: 33.9 G/DL (ref 31–37)
MCV RBC AUTO: 95.9 FL
MONOCYTES # BLD AUTO: 0.46 X10(3) UL (ref 0.1–1)
MONOCYTES NFR BLD AUTO: 4.6 %
NEUTROPHILS # BLD AUTO: 8.5 X10 (3) UL (ref 1.5–7.7)
NEUTROPHILS # BLD AUTO: 8.5 X10(3) UL (ref 1.5–7.7)
NEUTROPHILS NFR BLD AUTO: 84.5 %
OSMOLALITY SERPL CALC.SUM OF ELEC: 294 MOSM/KG (ref 275–295)
P AXIS: 80 DEGREES
P-R INTERVAL: 158 MS
PLATELET # BLD AUTO: 176 10(3)UL (ref 150–450)
POTASSIUM SERPL-SCNC: 3.4 MMOL/L (ref 3.5–5.1)
Q-T INTERVAL: 462 MS
QRS DURATION: 88 MS
QTC CALCULATION (BEZET): 445 MS
R AXIS: 15 DEGREES
RBC # BLD AUTO: 4.86 X10(6)UL
SODIUM SERPL-SCNC: 141 MMOL/L (ref 136–145)
T AXIS: 36 DEGREES
VENTRICULAR RATE: 56 BPM
WBC # BLD AUTO: 10.1 X10(3) UL (ref 4–11)

## 2022-11-27 PROCEDURE — 99232 SBSQ HOSP IP/OBS MODERATE 35: CPT | Performed by: INTERNAL MEDICINE

## 2022-11-27 PROCEDURE — 99223 1ST HOSP IP/OBS HIGH 75: CPT | Performed by: OTHER

## 2022-11-27 RX ORDER — QUETIAPINE FUMARATE 25 MG/1
25 TABLET, FILM COATED ORAL
Status: DISCONTINUED | OUTPATIENT
Start: 2022-11-27 | End: 2022-12-06

## 2022-11-27 RX ORDER — QUETIAPINE FUMARATE 25 MG/1
25 TABLET, FILM COATED ORAL NIGHTLY
Status: DISCONTINUED | OUTPATIENT
Start: 2022-11-27 | End: 2022-12-06

## 2022-11-27 NOTE — PLAN OF CARE
Assumed care at [address] [facility]. Aox1. RA. NSR on tele. NPO. Cannot completed swallow eval. Pt not following commands. Noncompliant with medications. IVF not started due to patient screaming. Aspiration precautions in place. BP elevated. X2 hydralazine administered. Pt agitated and combative. Restraints on. Neuro q2h. JOE. Safety precautions in place. Bed lowered and locked. Call light in reach. Continue with current  POC.

## 2022-11-27 NOTE — PLAN OF CARE
NURSING ADMISSION NOTE      Patient admitted via Cart  Oriented to room. Safety precautions initiated. Bed in low position. Call light in reach. 1800 pt admitted to room. Pt agitated, yelling, impulsive, attempted to hit, kick and remove IV. Wrist restraints and posey applied.

## 2022-11-27 NOTE — PLAN OF CARE
Assumed care around 0730. AxO x1, no acute neuro changes, see flowsheets. NSR/ST on tele, RA, VSS.   IVF started. Bedside swallow completed and passed, meds taken with sips of water. [place] vest continued, soft wrist restraints discontinued. Video monitoring on. Scheduled/PRN Seroquel added, 1 dose given this afternoon. Pt more cooperative and pleasant. PT/OT and SLP eval completed today. Family at bedside this am.   Plan: MRI, Echo. Pt and family updated on poc. Pt needs met, call light within reach.

## 2022-11-28 ENCOUNTER — APPOINTMENT (OUTPATIENT)
Dept: CV DIAGNOSTICS | Facility: HOSPITAL | Age: 82
End: 2022-11-28
Attending: INTERNAL MEDICINE
Payer: MEDICARE

## 2022-11-28 ENCOUNTER — APPOINTMENT (OUTPATIENT)
Dept: CV DIAGNOSTICS | Facility: HOSPITAL | Age: 82
DRG: 065 | End: 2022-11-28
Attending: NURSE PRACTITIONER
Payer: MEDICARE

## 2022-11-28 ENCOUNTER — APPOINTMENT (OUTPATIENT)
Dept: CT IMAGING | Facility: HOSPITAL | Age: 82
End: 2022-11-28
Attending: NURSE PRACTITIONER
Payer: MEDICARE

## 2022-11-28 ENCOUNTER — APPOINTMENT (OUTPATIENT)
Dept: MRI IMAGING | Facility: HOSPITAL | Age: 82
DRG: 065 | End: 2022-11-28
Attending: INTERNAL MEDICINE
Payer: MEDICARE

## 2022-11-28 ENCOUNTER — APPOINTMENT (OUTPATIENT)
Dept: CV DIAGNOSTICS | Facility: HOSPITAL | Age: 82
DRG: 065 | End: 2022-11-28
Attending: INTERNAL MEDICINE
Payer: MEDICARE

## 2022-11-28 ENCOUNTER — APPOINTMENT (OUTPATIENT)
Dept: CT IMAGING | Facility: HOSPITAL | Age: 82
DRG: 065 | End: 2022-11-28
Attending: NURSE PRACTITIONER
Payer: MEDICARE

## 2022-11-28 ENCOUNTER — APPOINTMENT (OUTPATIENT)
Dept: CV DIAGNOSTICS | Facility: HOSPITAL | Age: 82
End: 2022-11-28
Attending: NURSE PRACTITIONER
Payer: MEDICARE

## 2022-11-28 ENCOUNTER — APPOINTMENT (OUTPATIENT)
Dept: MRI IMAGING | Facility: HOSPITAL | Age: 82
End: 2022-11-28
Attending: INTERNAL MEDICINE
Payer: MEDICARE

## 2022-11-28 LAB
BILIRUB UR QL STRIP.AUTO: NEGATIVE
CHOLEST SERPL-MCNC: 224 MG/DL (ref ?–200)
CLARITY UR REFRACT.AUTO: CLEAR
COLOR UR AUTO: YELLOW
CREAT BLD-MCNC: 0.46 MG/DL
GFR SERPLBLD BASED ON 1.73 SQ M-ARVRAT: 94 ML/MIN/1.73M2 (ref 60–?)
GLUCOSE BLD-MCNC: 105 MG/DL (ref 70–99)
GLUCOSE BLD-MCNC: 91 MG/DL (ref 70–99)
GLUCOSE BLD-MCNC: 94 MG/DL (ref 70–99)
GLUCOSE BLD-MCNC: 97 MG/DL (ref 70–99)
GLUCOSE UR STRIP.AUTO-MCNC: NEGATIVE MG/DL
HDLC SERPL-MCNC: 47 MG/DL (ref 40–59)
HYALINE CASTS #/AREA URNS AUTO: PRESENT /LPF
KETONES UR STRIP.AUTO-MCNC: 40 MG/DL
LDLC SERPL CALC-MCNC: 139 MG/DL (ref ?–100)
NITRITE UR QL STRIP.AUTO: POSITIVE
NONHDLC SERPL-MCNC: 177 MG/DL (ref ?–130)
PH UR STRIP.AUTO: 5.5 [PH] (ref 5–8)
POTASSIUM SERPL-SCNC: 3.5 MMOL/L (ref 3.5–5.1)
POTASSIUM SERPL-SCNC: 3.5 MMOL/L (ref 3.5–5.1)
PROT UR STRIP.AUTO-MCNC: NEGATIVE MG/DL
SP GR UR STRIP.AUTO: >=1.03 (ref 1–1.03)
TRIGL SERPL-MCNC: 213 MG/DL (ref 30–149)
UROBILINOGEN UR STRIP.AUTO-MCNC: 0.2 MG/DL
VLDLC SERPL CALC-MCNC: 40 MG/DL (ref 0–30)

## 2022-11-28 PROCEDURE — 70551 MRI BRAIN STEM W/O DYE: CPT | Performed by: INTERNAL MEDICINE

## 2022-11-28 PROCEDURE — 70496 CT ANGIOGRAPHY HEAD: CPT | Performed by: NURSE PRACTITIONER

## 2022-11-28 PROCEDURE — 70498 CT ANGIOGRAPHY NECK: CPT | Performed by: NURSE PRACTITIONER

## 2022-11-28 PROCEDURE — 99232 SBSQ HOSP IP/OBS MODERATE 35: CPT | Performed by: INTERNAL MEDICINE

## 2022-11-28 PROCEDURE — 99232 SBSQ HOSP IP/OBS MODERATE 35: CPT | Performed by: OTHER

## 2022-11-28 PROCEDURE — 93306 TTE W/DOPPLER COMPLETE: CPT | Performed by: NURSE PRACTITIONER

## 2022-11-28 RX ORDER — POTASSIUM CHLORIDE 1.5 G/1.77G
40 POWDER, FOR SOLUTION ORAL EVERY 4 HOURS
Status: ACTIVE | OUTPATIENT
Start: 2022-11-28 | End: 2022-11-28

## 2022-11-28 RX ORDER — ATORVASTATIN CALCIUM 40 MG/1
40 TABLET, FILM COATED ORAL NIGHTLY
Status: DISCONTINUED | OUTPATIENT
Start: 2022-11-28 | End: 2022-11-30

## 2022-11-28 RX ORDER — LORAZEPAM 2 MG/ML
1 INJECTION INTRAMUSCULAR ONCE
Status: COMPLETED | OUTPATIENT
Start: 2022-11-28 | End: 2022-11-28

## 2022-11-28 NOTE — PLAN OF CARE
Assumed care at [address]. AOX1. NSR/ST on tele. RA. VSS. IVF running. Seroquel given for agitation and restlessness. PRN hydralazine given x1 for BP. Neuro q4h. No new neuro deficits. JOE for half of the assessment. Pt unable to follow commands   Restraints on. Freq rounding. Safety precautions in place. Bed lowered and locked. Call light in reach. All questions and concerns addressed. Continue with current POC.

## 2022-11-28 NOTE — CM/SW NOTE
Pt discussed in rounds, chart reviewed. Pt worked with therapy, recommending AR. PMR pending. Pt had to be restrained due to agitation, [facility] liaison made aware. SW made aware by RN that discussions of Memory Care at [place]. ELO sent referrals in Aidin to [facility] who have memory care facilities. pasrr screen completed. If family is interested in memory care as a long term plan, then will provide resources. ELO left  for . Awaiting call back. ELO available for dc planning.     YOAN Grant  Discharge 2011 [facility]

## 2022-11-28 NOTE — PLAN OF CARE
Assumed care around 0730  Alert, oriented to self only. Follows some commands. RUE weakness present  Echo and CTA completed  MRI completed. Pt premedicated with ativan. Pt lethargic after ativan given. [place] removed  Daughter at bedside.  Updated on POC

## 2022-11-29 LAB
GLUCOSE BLD-MCNC: 101 MG/DL (ref 70–99)
GLUCOSE BLD-MCNC: 123 MG/DL (ref 70–99)
GLUCOSE BLD-MCNC: 184 MG/DL (ref 70–99)
GLUCOSE BLD-MCNC: 97 MG/DL (ref 70–99)

## 2022-11-29 PROCEDURE — 99231 SBSQ HOSP IP/OBS SF/LOW 25: CPT | Performed by: NURSE PRACTITIONER

## 2022-11-29 PROCEDURE — 99232 SBSQ HOSP IP/OBS MODERATE 35: CPT | Performed by: INTERNAL MEDICINE

## 2022-11-29 NOTE — PLAN OF CARE
Assumed care at [address]. AOX1. NSR/ST on tele. RA. VSS. IVF running. Scheduled Seroquel given for agitation and restlessness. Neuro q4h. No new neuro deficits. JOE for half of the assessment. Pt unable to follow commands   Freq rounding. Tylenol given x1 for R arm pain. Safety precautions in place. Bed lowered and locked. Call light in reach. All questions and concerns addressed. Continue with current POC.

## 2022-11-29 NOTE — CM/SW NOTE
ELO met with pt, spouse and RN at bedside. Discussed dc plans and recs, PMR agreeable to AR. Spouse agreeable to [facility], aware [facility] accepted however need for insurance auth. If insurance denied, then [facility] referrals pending. (pasrr screen in review). SW encouraged spouse to also call insurance as plan can take a few days. SW will leave accepting [facility] list at bedside if needed as  back up plan. ELO available for [employer].      YOAN Chaudhary  Discharge 2011 [facility]

## 2022-11-30 LAB
ANION GAP SERPL CALC-SCNC: 5 MMOL/L (ref 0–18)
BUN BLD-MCNC: 13 MG/DL (ref 7–18)
CALCIUM BLD-MCNC: 9.1 MG/DL (ref 8.5–10.1)
CHLORIDE SERPL-SCNC: 112 MMOL/L (ref 98–112)
CO2 SERPL-SCNC: 25 MMOL/L (ref 21–32)
CREAT BLD-MCNC: 0.37 MG/DL
ERYTHROCYTE [DISTWIDTH] IN BLOOD BY AUTOMATED COUNT: 13.1 %
GFR SERPLBLD BASED ON 1.73 SQ M-ARVRAT: 99 ML/MIN/1.73M2 (ref 60–?)
GLUCOSE BLD-MCNC: 100 MG/DL (ref 70–99)
GLUCOSE BLD-MCNC: 104 MG/DL (ref 70–99)
GLUCOSE BLD-MCNC: 93 MG/DL (ref 70–99)
GLUCOSE BLD-MCNC: 94 MG/DL (ref 70–99)
GLUCOSE BLD-MCNC: 99 MG/DL (ref 70–99)
HCT VFR BLD AUTO: 44 %
HGB BLD-MCNC: 15.2 G/DL
MCH RBC QN AUTO: 33.4 PG (ref 26–34)
MCHC RBC AUTO-ENTMCNC: 34.5 G/DL (ref 31–37)
MCV RBC AUTO: 96.7 FL
OSMOLALITY SERPL CALC.SUM OF ELEC: 294 MOSM/KG (ref 275–295)
PLATELET # BLD AUTO: 155 10(3)UL (ref 150–450)
POTASSIUM SERPL-SCNC: 3.8 MMOL/L (ref 3.5–5.1)
RBC # BLD AUTO: 4.55 X10(6)UL
SODIUM SERPL-SCNC: 142 MMOL/L (ref 136–145)
WBC # BLD AUTO: 5 X10(3) UL (ref 4–11)

## 2022-11-30 PROCEDURE — 99232 SBSQ HOSP IP/OBS MODERATE 35: CPT | Performed by: HOSPITALIST

## 2022-11-30 RX ORDER — HALOPERIDOL 5 MG/ML
2 INJECTION INTRAMUSCULAR EVERY 4 HOURS PRN
Status: DISCONTINUED | OUTPATIENT
Start: 2022-11-30 | End: 2022-12-06

## 2022-11-30 RX ORDER — PROPRANOLOL HYDROCHLORIDE 10 MG/1
20 TABLET ORAL 2 TIMES DAILY
Status: DISCONTINUED | OUTPATIENT
Start: 2022-11-30 | End: 2022-12-01

## 2022-11-30 RX ORDER — ATORVASTATIN CALCIUM 10 MG/1
10 TABLET, FILM COATED ORAL NIGHTLY
Status: DISCONTINUED | OUTPATIENT
Start: 2022-11-30 | End: 2022-12-06

## 2022-11-30 RX ORDER — POTASSIUM CHLORIDE 1.5 G/1.77G
40 POWDER, FOR SOLUTION ORAL ONCE
Status: COMPLETED | OUTPATIENT
Start: 2022-11-30 | End: 2022-11-30

## 2022-11-30 RX ORDER — AMLODIPINE BESYLATE 5 MG/1
5 TABLET ORAL DAILY
Status: DISCONTINUED | OUTPATIENT
Start: 2022-11-30 | End: 2022-12-06

## 2022-11-30 RX ORDER — MEMANTINE HYDROCHLORIDE 5 MG/1
5 TABLET ORAL DAILY
Status: DISCONTINUED | OUTPATIENT
Start: 2022-11-30 | End: 2022-12-06

## 2022-11-30 NOTE — PLAN OF CARE
Significant Event - Fall Note    Date/Time of Fall: November 30, 2022 at 0    Fall huddle completed: Yes    Description of patient fall:     Patient fell from: Bed     Activity when fall occurred: Unknown     Where did fall occur: Patient room     Was the fall assisted: Found on floor/unassisted to floor    Who witnessed the fall: Unwitnessed    Patient narrative of fall: Patient was going to find [employer]"    Staff narrative of fall: Responded to a bed exit alarm. Patient found on knees facing the bed. She had climbed out over side rails. Denies hitting her head. Denies injury. Side rails were up, ROHINI was on, video camera was in use.      Name of Provider notified of fall: Dr. Wendy Casillas notification: Family notified    Factors contributing to fall:     Physical: Balance impairment, Deconditioned, Unsteady gait and Weakness/fatigue     Psychological: Confused, Disoriented and Impulsive     Environmental: Equipment     Medications received in the past 8 hours:   Medication(s) Administered in past 8 Hours from 11/30/2022 0809 to 11/30/2022 1609     Date/Time Order Dose Route Action Action by Comments    11/30/2022 0810 CST acetaminophen (Tylenol) tab 650 mg 650 mg Oral Given Ross Huizar RN --    11/30/2022 2392 CST amLODIPine (Norvasc) tab 5 mg 5 mg Oral Given Ross Huizar RN --    11/30/2022 8783 CST aspirin tab 325 mg 325 mg Oral Given Ross Huizar RN --    11/30/2022 1418 CST cefTRIAXone (Rocephin) 1 g in D5W 100 mL IVPB-ADD 1 g Intravenous [address] Ross Huizar RN --    11/30/2022 1418 CST heparin (Porcine) 5000 UNIT/ML injection 5,000 Units 5,000 Units Subcutaneous Given Ross Huizar RN --    11/30/2022 0810 CST hydrALAzine (Apresoline) 20 mg/mL injection 10 mg 10 mg Intravenous Given Ross Huizar RN --    11/30/2022 0904 CST memantine (Namenda) tab 5 mg 5 mg Oral Given Ross Huizar RN --    11/30/2022 8940 CST propranolol (Inderal) tab 20 mg 20 mg Oral Given Ross Huizar RN --          Was patient identified as high fall risk prior to fall:         Fall Risk Level: High Fall Risk                      What interventions were in place prior to fall: Bed alarm, Bed in lowest position, Camera, Call light within reach, Diversion activities and [place] footwear    Interventions post fall: Assistive devices (wheelchair, commode, walker, gait belt), Bed alarm, Bed in lowest position, Camera, Call light within reach, Chair alarm, Diversion activities and Rounding    Additional comments:

## 2022-11-30 NOTE — PLAN OF CARE
Assumed care around 0730  Alert, oriented x1-2. RUE weakness present  Urinating small, frequent amts  c/o RUE pain, tylenol and hot pack given. Elevated on pillow  Spouse at bedside.  Updated on POC

## 2022-11-30 NOTE — CM/SW NOTE
Message sent to [facility] liaison, waiting for insurance auth. PT OT to see today for updated therapy notes. SW sent updated progress notes in Aidin to [facility] referrals as back up. SW to leave [facility] list at bedside for family to review IF insurance denies AR. pasrr screen completed and attached in [facility]. Updates to follow.

## 2022-11-30 NOTE — PROGRESS NOTES
Stroke binder given to patient; unable to complete education as patient is unable to fully participate. Family not present during education. Will attempt to provide education again when family at bedside. Patient discharge instructions (References/Attachments) updated.      Sherri Stone RN, BSN  Stroke Navigator  [phone]

## 2022-11-30 NOTE — PROGRESS NOTES
Assumed care @ 1930. Pt a/o x1, neuro checks q shift, no new changes. VSS. Tele SR. No acute respiratory distress noted. Denies any pain. Pt resting in bed. (-) BM. Incontinent to urine, Purewick in place. No other complaints made. Will continue to monitor.

## 2022-11-30 NOTE — PLAN OF CARE
Assumed care at 0730. A&Ox1, impulsive, confused, poor safety awareness. No acute neuro changes. Hypertensive, hydralazine, propranolol and norvasc given. (+) orthostatics. Increased urinary frequency. Setting off ROHINI frequently. Had an unwitnessed fall out of bed, spouse and MD notified, denies injury, no new orders. Video monitor in place, bed in lowest position, ROHINI on. Seroquel given with no behavioral changes. Patient hit this RN, Jon patel initiated at 1900.

## 2022-12-01 ENCOUNTER — APPOINTMENT (OUTPATIENT)
Dept: CT IMAGING | Facility: HOSPITAL | Age: 82
End: 2022-12-01
Attending: HOSPITALIST
Payer: MEDICARE

## 2022-12-01 ENCOUNTER — APPOINTMENT (OUTPATIENT)
Dept: CT IMAGING | Facility: HOSPITAL | Age: 82
DRG: 065 | End: 2022-12-01
Attending: HOSPITALIST
Payer: MEDICARE

## 2022-12-01 LAB
GLUCOSE BLD-MCNC: 117 MG/DL (ref 70–99)
GLUCOSE BLD-MCNC: 157 MG/DL (ref 70–99)
GLUCOSE BLD-MCNC: 93 MG/DL (ref 70–99)
POTASSIUM SERPL-SCNC: 4 MMOL/L (ref 3.5–5.1)

## 2022-12-01 PROCEDURE — 70450 CT HEAD/BRAIN W/O DYE: CPT | Performed by: HOSPITALIST

## 2022-12-01 NOTE — PROGRESS NOTES
Assumed care at [address], able to state name. Disoriented to place, situation. Hx of dementia  Neuro check Qshift with Right arm deficit r/t hx of CVA  On room air. Around 11Am- pt had a change in status. pt had bradycardia with HR low in the 30s, non responsive. RRT was called. Family member stated pt became unresponsive during transfer from bed to chair. Pt code status updated by family to DNAR/comfort. Atropine given during RRT. MD aware. Pt responsive to Atropine    -12:40p: Pt is awake, speaking, confuse (baseline). HR in the 70s. BPR-117/68. Denies pain. Stated she is hungry. Lunch ordered. Family at bedside. Frequent rounding done. Safety and fall precautions in place. Will continue to monitor pt. -1643:Pt was able to eat lunch. Back to baseline. V/S and HR stable. Family refused CT head.

## 2022-12-01 NOTE — PLAN OF CARE
Received pt at 1930  Pt AOx1, NSR, RA, VSS  Q shift Neuro. See flowsheet  Jon remained on [address]  D/c Planning: once medically cleared  Call light within reach. Needs currently met       Problem: Safety Risk - Non-Violent Restraints  Goal: Patient will remain free from self-harm  Description: INTERVENTIONS:  - Apply the least restrictive restraint to prevent harm  - Notify patient and family of reasons restraints applied  - Assess for any contributing factors to confusion (electrolyte disturbances, delirium, medications)  - Discontinue any unnecessary medical devices as soon as possible  - Assess the patient's physical comfort, circulation, skin condition, hydration, nutrition and elimination needs   - Reorient and redirection as needed  - Assess for the need to continue restraints  Outcome: Progressing     Problem: NEUROLOGICAL - ADULT  Goal: Achieves stable or improved neurological status  Description: INTERVENTIONS  - Assess for and report changes in neurological status  - Initiate measures to prevent increased intracranial pressure  - Maintain blood pressure and fluid volume within ordered parameters to optimize cerebral perfusion and minimize risk of hemorrhage  - Monitor temperature, glucose, and sodium.  Initiate appropriate interventions as ordered  Outcome: Progressing  Goal: Achieves maximal functionality and self care  Description: INTERVENTIONS  - Monitor swallowing and airway patency with patient fatigue and changes in neurological status  - Encourage and assist patient to increase activity and self care with guidance from PT/OT  - Encourage visually impaired, hearing impaired and aphasic patients to use assistive/communication devices  Outcome: Progressing

## 2022-12-01 NOTE — PROGRESS NOTES
12/01/22 1201   Clinical Encounter Type   Visited With Health care provider;Patient and family together   Routine Visit   (Responded to call)   Cheondoism Encounters   Cheondoism Needs Prayer  (Per request, prayed Our Father . ... together w/Noemi and her family. Promoted a sense of inner peace and comfort.)   Sacramental Encounters   Sacrament of Sick-Anointing Anointed   Patient Spiritual Encounters   Spiritual Assessment Completed Yes   Family Spiritual Encounters   Family Coping Accepting; Sadness  (Spouse, daughter and son were present by the bedside)   Taxonomy   Intended Effects Promote a sense of peace   Methods Encourage sharing of feelings; Offer emotional support; Offer spiritual/Quaker support   Interventions Acknowledge current situation; Active listening;Assist with identifying strengths;Identify supportive relationship(s);[place];Explain  role; Facilitate preparing for end of life    provided emotional and spiritual support in the midst of the code/call. Notified Emergency [address] for the [place] rites. \" Also, extended support to ur team members. Gladys Zayas was seen by Leslie Murray. Received end-of-life rites and rituals incl. prayer, Scripture, support and Sacrament of the [address] / \"last rites\". Spiritual Care support can be requested via an [employer] consult. Rev. Franc Velazquez M.Div., M.T.S., [facility]., CGCS.   Spiritual Care Lead

## 2022-12-01 NOTE — PROGRESS NOTES
Progress reviewed for Acute rehab appropriateness. Pt today had RRT called for bradycardia with unresponsiveness. Did respond to Atropine. Code status now DNAR/Comfort care. Also has severe underlying dementia/on and off agitation. Plan: Given current medical/cognitive/functional status, would recommend [facility]/palliative care when ready for dc. Thank you.     Ya Grey MD  [facility]

## 2022-12-02 LAB
CREAT BLD-MCNC: 0.37 MG/DL
GFR SERPLBLD BASED ON 1.73 SQ M-ARVRAT: 99 ML/MIN/1.73M2 (ref 60–?)
GLUCOSE BLD-MCNC: 101 MG/DL (ref 70–99)
GLUCOSE BLD-MCNC: 122 MG/DL (ref 70–99)
GLUCOSE BLD-MCNC: 70 MG/DL (ref 70–99)
GLUCOSE BLD-MCNC: 85 MG/DL (ref 70–99)
GLUCOSE BLD-MCNC: 99 MG/DL (ref 70–99)
MAGNESIUM SERPL-MCNC: 2 MG/DL (ref 1.6–2.6)
POTASSIUM SERPL-SCNC: 3.5 MMOL/L (ref 3.5–5.1)

## 2022-12-02 PROCEDURE — 99232 SBSQ HOSP IP/OBS MODERATE 35: CPT | Performed by: HOSPITALIST

## 2022-12-02 RX ORDER — PHENAZOPYRIDINE HYDROCHLORIDE 100 MG/1
100 TABLET, FILM COATED ORAL
Status: DISPENSED | OUTPATIENT
Start: 2022-12-02 | End: 2022-12-04

## 2022-12-02 RX ORDER — POTASSIUM CHLORIDE 1.5 G/1.77G
40 POWDER, FOR SOLUTION ORAL EVERY 4 HOURS
Status: COMPLETED | OUTPATIENT
Start: 2022-12-02 | End: 2022-12-02

## 2022-12-02 NOTE — PLAN OF CARE
Received care @ 0730  A&Ox1; neuro status unchanged  NS on tele; pt had 10 beats of v-tach. MD notified. Potassium replaced  Up to bedside commode  IV Rocephin infusing per order  Good PO intake   PRN hydralazine given for increased BP  PT/OT to see    POC discussed with patient, family, & physicians.

## 2022-12-02 NOTE — PLAN OF CARE
Received pt at [address] [facility], NSR/SB, RA, VSS  Neuro Qshift. See flowsheets  PRN tylenol for leg pain  D/c Planning: TBD  Call light within reach. Needs currently met      Problem: Safety Risk - Non-Violent Restraints  Goal: Patient will remain free from self-harm  Description: INTERVENTIONS:  - Apply the least restrictive restraint to prevent harm  - Notify patient and family of reasons restraints applied  - Assess for any contributing factors to confusion (electrolyte disturbances, delirium, medications)  - Discontinue any unnecessary medical devices as soon as possible  - Assess the patient's physical comfort, circulation, skin condition, hydration, nutrition and elimination needs   - Reorient and redirection as needed  - Assess for the need to continue restraints  Outcome: Progressing     Problem: NEUROLOGICAL - ADULT  Goal: Achieves stable or improved neurological status  Description: INTERVENTIONS  - Assess for and report changes in neurological status  - Initiate measures to prevent increased intracranial pressure  - Maintain blood pressure and fluid volume within ordered parameters to optimize cerebral perfusion and minimize risk of hemorrhage  - Monitor temperature, glucose, and sodium.  Initiate appropriate interventions as ordered  Outcome: Progressing  Goal: Achieves maximal functionality and self care  Description: INTERVENTIONS  - Monitor swallowing and airway patency with patient fatigue and changes in neurological status  - Encourage and assist patient to increase activity and self care with guidance from PT/OT  - Encourage visually impaired, hearing impaired and aphasic patients to use assistive/communication devices  Outcome: Progressing     Problem: Diabetes/Glucose Control  Goal: Glucose maintained within prescribed range  Description: INTERVENTIONS:  - Monitor Blood Glucose as ordered  - Assess for signs and symptoms of hyperglycemia and hypoglycemia  - Administer ordered medications to maintain glucose within target range  - Assess barriers to adequate nutritional intake and initiate nutrition consult as needed  - Instruct patient on self management of diabetes  Outcome: Progressing

## 2022-12-03 LAB
ANION GAP SERPL CALC-SCNC: 5 MMOL/L (ref 0–18)
BUN BLD-MCNC: 16 MG/DL (ref 7–18)
CALCIUM BLD-MCNC: 9.5 MG/DL (ref 8.5–10.1)
CHLORIDE SERPL-SCNC: 111 MMOL/L (ref 98–112)
CO2 SERPL-SCNC: 24 MMOL/L (ref 21–32)
CREAT BLD-MCNC: 0.48 MG/DL
GFR SERPLBLD BASED ON 1.73 SQ M-ARVRAT: 93 ML/MIN/1.73M2 (ref 60–?)
GLUCOSE BLD-MCNC: 100 MG/DL (ref 70–99)
GLUCOSE BLD-MCNC: 103 MG/DL (ref 70–99)
GLUCOSE BLD-MCNC: 112 MG/DL (ref 70–99)
MAGNESIUM SERPL-MCNC: 2.2 MG/DL (ref 1.6–2.6)
OSMOLALITY SERPL CALC.SUM OF ELEC: 292 MOSM/KG (ref 275–295)
POTASSIUM SERPL-SCNC: 3.8 MMOL/L (ref 3.5–5.1)
POTASSIUM SERPL-SCNC: 3.9 MMOL/L (ref 3.5–5.1)
SODIUM SERPL-SCNC: 140 MMOL/L (ref 136–145)

## 2022-12-03 PROCEDURE — 99232 SBSQ HOSP IP/OBS MODERATE 35: CPT | Performed by: HOSPITALIST

## 2022-12-03 RX ORDER — POTASSIUM CHLORIDE 1.5 G/1.77G
40 POWDER, FOR SOLUTION ORAL ONCE
Status: COMPLETED | OUTPATIENT
Start: 2022-12-03 | End: 2022-12-03

## 2022-12-03 NOTE — PLAN OF CARE
Assumed care of pt around 1900. Alert, oriented to self. Can be impulsive, tries to get out of bed. Frequent reorientation. Neuros q shift. RUE weakness. Up w/ 2 stand & pivot to bedside commode. High fall risk. Currently resting in bed w/ safety precautions in place.

## 2022-12-03 NOTE — CM/SW NOTE
Hoda spoke to daughter this am.  Discussed plans for rehab. Dr. Chelsie Kaufman did eval after pt had a unresponsive episode which daughter states was medication related. Will need PT to re-eval and then ask Dr. Chelsie Kaufman to re-eval again. Daughter states pt was appropriate for acute rehab at the beginning of the week so should still be now. Await PT re-eval.  Daughter works at [employer] and has hx of working at [employer] so she understands the qualifications for acute rehab.     Zenobia Huang, [facility]  Pager [phone]

## 2022-12-04 LAB
ATRIAL RATE: 79 BPM
P AXIS: 13 DEGREES
P-R INTERVAL: 162 MS
POTASSIUM SERPL-SCNC: 4.4 MMOL/L (ref 3.5–5.1)
Q-T INTERVAL: 390 MS
QRS DURATION: 88 MS
QTC CALCULATION (BEZET): 447 MS
R AXIS: 25 DEGREES
T AXIS: 65 DEGREES
VENTRICULAR RATE: 79 BPM

## 2022-12-04 PROCEDURE — 99232 SBSQ HOSP IP/OBS MODERATE 35: CPT | Performed by: HOSPITALIST

## 2022-12-04 NOTE — PLAN OF CARE
Stroke binder given to patient's spouse and son. The following education was provided:    BEFAST- stroke warning signs and symptoms. How to activate EMS for stroke. Personalized risk factors for stroke including HL, HTN and ETOH consumption (Spouse reports she drinks 1 bottle of chardonnay everyday). Healthy lifestyle (nutrition, exercise and lowering ETOH consumption). Need for follow up after discharge. Patient's comprehension of stroke education is limited. Patient's spouse and son verbalize understanding of teaching. All pertinent questions and concerns were addressed.

## 2022-12-04 NOTE — PLAN OF CARE
Assumed care of pt around 1900. Alert & oriented to self & place. Neuros q shift. See flowsheets for full assessment. RUE weakness from elbow down. K replaced. Denies pain overnight. PT rec acute rehab. Up w/ 2 assist.  High fall risk. Resting in bed w/ safety precautions in place.

## 2022-12-04 NOTE — PLAN OF CARE
Pt has had multiple changes in rhythm today on tele. Cards aware. EKG done. Multiple strips in chart. bp stable. No s/s. Appears to have a bundle switch at times that shows the rates being double then what they really are at times. . Intermittent BBB, SR, freq PVC's and PAC's. EP to see her tomorrow. Will cont to monitor.

## 2022-12-04 NOTE — PLAN OF CARE
Pt much more alert. Forgetful but easily reminded. Worked well with PT. Following directions. Ate well. Up in chair to eat meals. Vss. Will cont to monitor.

## 2022-12-05 LAB
ATRIAL RATE: 85 BPM
P AXIS: 34 DEGREES
P-R INTERVAL: 158 MS
Q-T INTERVAL: 380 MS
QRS DURATION: 94 MS
QTC CALCULATION (BEZET): 452 MS
R AXIS: 6 DEGREES
T AXIS: 61 DEGREES
VENTRICULAR RATE: 85 BPM

## 2022-12-05 PROCEDURE — 99232 SBSQ HOSP IP/OBS MODERATE 35: CPT | Performed by: HOSPITALIST

## 2022-12-05 NOTE — PLAN OF CARE
Worked well with PT today. Alert. Forgetful but can be re-directed. Aware she is in hospital.  at bedside. Ate well for breakfast. Remains is SR today. EP to see. Will cont to monitor.

## 2022-12-05 NOTE — PLAN OF CARE
Assumed care @ 1900. Patient is confused, forgetful but follows commands  On telemetry monitoring. Updated patient with plan of care  Ensures patient safety. Maintained a calm and safe environment. Side rails up x2. Needs attended. Call light within reach, will continue to monitor. Instructed to call when assistance needed. Left in bed resting comfortably. Problem: Safety Risk - Non-Violent Restraints  Goal: Patient will remain free from self-harm  Description: INTERVENTIONS:  - Apply the least restrictive restraint to prevent harm  - Notify patient and family of reasons restraints applied  - Assess for any contributing factors to confusion (electrolyte disturbances, delirium, medications)  - Discontinue any unnecessary medical devices as soon as possible  - Assess the patient's physical comfort, circulation, skin condition, hydration, nutrition and elimination needs   - Reorient and redirection as needed  - Assess for the need to continue restraints  Outcome: Progressing     Problem: NEUROLOGICAL - ADULT  Goal: Achieves stable or improved neurological status  Description: INTERVENTIONS  - Assess for and report changes in neurological status  - Initiate measures to prevent increased intracranial pressure  - Maintain blood pressure and fluid volume within ordered parameters to optimize cerebral perfusion and minimize risk of hemorrhage  - Monitor temperature, glucose, and sodium.  Initiate appropriate interventions as ordered  Outcome: Progressing  Goal: Achieves maximal functionality and self care  Description: INTERVENTIONS  - Monitor swallowing and airway patency with patient fatigue and changes in neurological status  - Encourage and assist patient to increase activity and self care with guidance from PT/OT  - Encourage visually impaired, hearing impaired and aphasic patients to use assistive/communication devices  Outcome: Progressing     Problem: Diabetes/Glucose Control  Goal: Glucose maintained within prescribed range  Description: INTERVENTIONS:  - Monitor Blood Glucose as ordered  - Assess for signs and symptoms of hyperglycemia and hypoglycemia  - Administer ordered medications to maintain glucose within target range  - Assess barriers to adequate nutritional intake and initiate nutrition consult as needed  - Instruct patient on self management of diabetes  Outcome: Progressing

## 2022-12-06 ENCOUNTER — PATIENT OUTREACH (OUTPATIENT)
Dept: CASE MANAGEMENT | Age: 82
End: 2022-12-06

## 2022-12-06 VITALS
HEIGHT: 66 IN | TEMPERATURE: 98 F | HEART RATE: 94 BPM | SYSTOLIC BLOOD PRESSURE: 122 MMHG | OXYGEN SATURATION: 98 % | BODY MASS INDEX: 20.9 KG/M2 | WEIGHT: 130.06 LBS | DIASTOLIC BLOOD PRESSURE: 64 MMHG | RESPIRATION RATE: 20 BRPM

## 2022-12-06 LAB — SARS-COV-2 RNA RESP QL NAA+PROBE: NOT DETECTED

## 2022-12-06 PROCEDURE — 99239 HOSP IP/OBS DSCHRG MGMT >30: CPT | Performed by: HOSPITALIST

## 2022-12-06 RX ORDER — AMLODIPINE BESYLATE 5 MG/1
5 TABLET ORAL DAILY
Qty: 30 TABLET | Refills: 0 | Status: SHIPPED | OUTPATIENT
Start: 2022-12-07

## 2022-12-06 RX ORDER — QUETIAPINE FUMARATE 25 MG/1
25 TABLET, FILM COATED ORAL NIGHTLY
Qty: 30 TABLET | Refills: 0 | Status: SHIPPED | OUTPATIENT
Start: 2022-12-06 | End: 2022-12-23

## 2022-12-06 NOTE — PLAN OF CARE
NURSING DISCHARGE NOTE    Discharged Rehab facility via Ambulance. Accompanied by Family member and Support staff  Belongings Taken by patient/family. Pt AxO2-3  -forgetful, confused  RA  NSR  Pain present; RUE  -mild; tylenol gives relief  1x assist with walker  -up to chair    Neurochecks  -deficits noted  NIH daily  Aspirate precautions  Pt informed of POC, able to understand some due to cognition  -pt family informed of POC at bedside    [address] at  for report  -informed by  will receive callback, phone number given; awaiting callback   =called MJ at [phone] to give report    ->report given to [facility]; informed patient reason for admit, current VS and status, all questions answered. Pt and family informed and given discharge instructions, new medications, home medicatons, f/u dates, and medications to stop. Pt family informed to f/u with cardiology in regards to heart monitor outpatient. Pt and family verbally understand discharge instructions, all questions answered.       Problem: Patient/Family Goals  Goal: Patient/Family Long Term Goal  Description: Patient's Long Term Goal: discharge in stable condition    Interventions:  - PT/OT evaluation  - follow medications as ordered  - -180  - See additional Care Plan goals for specific interventions  12/6/2022 1133 by Marie Mendoza RN  Outcome: Adequate for Discharge  12/6/2022 1027 by Marie Mendoza RN  Outcome: Progressing  Goal: Patient/Family Short Term Goal  Description: Patient's Short Term Goal: manage BP level    Interventions:   - follow medications as ordered  - PRN as neeeded  - -180  - See additional Care Plan goals for specific interventions  12/6/2022 1133 by Marie Mendoza RN  Outcome: Adequate for Discharge  12/6/2022 1027 by Marie Mendoza RN  Outcome: Progressing     Problem: Safety Risk - Non-Violent Restraints  Goal: Patient will remain free from self-harm  Description: INTERVENTIONS:  - Apply the least restrictive restraint to prevent harm  - Notify patient and family of reasons restraints applied  - Assess for any contributing factors to confusion (electrolyte disturbances, delirium, medications)  - Discontinue any unnecessary medical devices as soon as possible  - Assess the patient's physical comfort, circulation, skin condition, hydration, nutrition and elimination needs   - Reorient and redirection as needed  - Assess for the need to continue restraints  12/6/2022 1133 by Graham Garcia RN  Outcome: Adequate for Discharge  12/6/2022 1027 by Graham Garcia RN  Outcome: Progressing     Problem: NEUROLOGICAL - ADULT  Goal: Achieves stable or improved neurological status  Description: INTERVENTIONS  - Assess for and report changes in neurological status  - Initiate measures to prevent increased intracranial pressure  - Maintain blood pressure and fluid volume within ordered parameters to optimize cerebral perfusion and minimize risk of hemorrhage  - Monitor temperature, glucose, and sodium.  Initiate appropriate interventions as ordered  12/6/2022 1133 by Graham Garcia RN  Outcome: Adequate for Discharge  12/6/2022 1027 by Graham Garcia RN  Outcome: Progressing  Goal: Achieves maximal functionality and self care  Description: INTERVENTIONS  - Monitor swallowing and airway patency with patient fatigue and changes in neurological status  - Encourage and assist patient to increase activity and self care with guidance from PT/OT  - Encourage visually impaired, hearing impaired and aphasic patients to use assistive/communication devices  12/6/2022 1133 by Graham Garcia RN  Outcome: Adequate for Discharge  12/6/2022 1027 by Graham Garcia RN  Outcome: Progressing     Problem: Diabetes/Glucose Control  Goal: Glucose maintained within prescribed range  Description: INTERVENTIONS:  - Monitor Blood Glucose as ordered  - Assess for signs and symptoms of hyperglycemia and hypoglycemia  - Administer ordered medications to maintain glucose within target range  - Assess barriers to adequate nutritional intake and initiate nutrition consult as needed  - Instruct patient on self management of diabetes  12/6/2022 1133 by Alejo Nuñez RN  Outcome: Adequate for Discharge  12/6/2022 1027 by Alejo Nuñez, RN  Outcome: Progressing

## 2022-12-06 NOTE — PLAN OF CARE
Assumed care @ 1900. Patient still confused and forgetful but redirectable. Follows commands  On telemetry monitoring. Updated patient with plan of care  Ensures patient safety. Maintained a calm and safe environment. Side rails up x2. Needs attended. Call light within reach, will continue to monitor. Instructed to call when assistance needed. Left in bed resting comfortably. Problem: Safety Risk - Non-Violent Restraints  Goal: Patient will remain free from self-harm  Description: INTERVENTIONS:  - Apply the least restrictive restraint to prevent harm  - Notify patient and family of reasons restraints applied  - Assess for any contributing factors to confusion (electrolyte disturbances, delirium, medications)  - Discontinue any unnecessary medical devices as soon as possible  - Assess the patient's physical comfort, circulation, skin condition, hydration, nutrition and elimination needs   - Reorient and redirection as needed  - Assess for the need to continue restraints  Outcome: Progressing     Problem: NEUROLOGICAL - ADULT  Goal: Achieves stable or improved neurological status  Description: INTERVENTIONS  - Assess for and report changes in neurological status  - Initiate measures to prevent increased intracranial pressure  - Maintain blood pressure and fluid volume within ordered parameters to optimize cerebral perfusion and minimize risk of hemorrhage  - Monitor temperature, glucose, and sodium.  Initiate appropriate interventions as ordered  Outcome: Progressing  Goal: Achieves maximal functionality and self care  Description: INTERVENTIONS  - Monitor swallowing and airway patency with patient fatigue and changes in neurological status  - Encourage and assist patient to increase activity and self care with guidance from PT/OT  - Encourage visually impaired, hearing impaired and aphasic patients to use assistive/communication devices  Outcome: Progressing     Problem: Diabetes/Glucose Control  Goal: Glucose maintained within prescribed range  Description: INTERVENTIONS:  - Monitor Blood Glucose as ordered  - Assess for signs and symptoms of hyperglycemia and hypoglycemia  - Administer ordered medications to maintain glucose within target range  - Assess barriers to adequate nutritional intake and initiate nutrition consult as needed  - Instruct patient on self management of diabetes  Outcome: Progressing

## 2022-12-06 NOTE — PROGRESS NOTES
TCM chart review. No TCM, patient was discharged to [facility] for acute rehab. Encounter closing.

## 2022-12-06 NOTE — CM/SW NOTE
auth approved through today for MJ. Pt cleared for german, RN aware. Covid test completed. Pt to dc to rm 1104, RN to call [phone] for report. Family updated by RNELO to confirm [employer] as well. Ambulance set up for noon, PCS completed.      YOAN Rae  Discharge [facility]

## 2022-12-22 NOTE — TELEPHONE ENCOUNTER
Pts mother was just discharged from [facility] and she was taking seroquil and needs an rx for this because she is now out   Pls call daughter to advise

## 2022-12-23 RX ORDER — QUETIAPINE FUMARATE 25 MG/1
25 TABLET, FILM COATED ORAL NIGHTLY
Qty: 30 TABLET | Refills: 0 | Status: SHIPPED | OUTPATIENT
Start: 2022-12-23

## 2023-01-04 ENCOUNTER — OFFICE VISIT (OUTPATIENT)
Dept: NEUROLOGY | Facility: CLINIC | Age: 83
End: 2023-01-04
Payer: MEDICARE

## 2023-01-04 VITALS
DIASTOLIC BLOOD PRESSURE: 60 MMHG | BODY MASS INDEX: 20.89 KG/M2 | SYSTOLIC BLOOD PRESSURE: 118 MMHG | HEIGHT: 66 IN | HEART RATE: 64 BPM | RESPIRATION RATE: 18 BRPM | WEIGHT: 130 LBS | OXYGEN SATURATION: 97 %

## 2023-01-04 DIAGNOSIS — I63.9 ACUTE CVA (CEREBROVASCULAR ACCIDENT) (HCC): Primary | ICD-10-CM

## 2023-01-04 PROCEDURE — 3008F BODY MASS INDEX DOCD: CPT | Performed by: OTHER

## 2023-01-04 PROCEDURE — 3074F SYST BP LT 130 MM HG: CPT | Performed by: OTHER

## 2023-01-04 PROCEDURE — 1111F DSCHRG MED/CURRENT MED MERGE: CPT | Performed by: OTHER

## 2023-01-04 PROCEDURE — 3078F DIAST BP <80 MM HG: CPT | Performed by: OTHER

## 2023-01-04 PROCEDURE — 99213 OFFICE O/P EST LOW 20 MIN: CPT | Performed by: OTHER

## 2023-01-05 ENCOUNTER — HOSPITAL ENCOUNTER (OUTPATIENT)
Age: 83
Discharge: ANOTHER HOSPITAL | End: 2023-01-05
Payer: MEDICARE

## 2023-01-05 ENCOUNTER — APPOINTMENT (OUTPATIENT)
Dept: GENERAL RADIOLOGY | Age: 83
End: 2023-01-05
Attending: NURSE PRACTITIONER
Payer: MEDICARE

## 2023-01-05 VITALS
RESPIRATION RATE: 20 BRPM | DIASTOLIC BLOOD PRESSURE: 73 MMHG | WEIGHT: 153 LBS | SYSTOLIC BLOOD PRESSURE: 153 MMHG | BODY MASS INDEX: 24.59 KG/M2 | OXYGEN SATURATION: 98 % | HEIGHT: 66 IN | HEART RATE: 72 BPM | TEMPERATURE: 98 F

## 2023-01-05 DIAGNOSIS — R79.89 ELEVATED D-DIMER: ICD-10-CM

## 2023-01-05 DIAGNOSIS — R53.81 MALAISE: Primary | ICD-10-CM

## 2023-01-05 DIAGNOSIS — R07.89 SENSATION OF CHEST PRESSURE: ICD-10-CM

## 2023-01-05 LAB
#MXD IC: 0.7 X10ˆ3/UL (ref 0.1–1)
BUN BLD-MCNC: 11 MG/DL (ref 7–18)
CHLORIDE BLD-SCNC: 108 MMOL/L (ref 98–112)
CO2 BLD-SCNC: 23 MMOL/L (ref 21–32)
CREAT BLD-MCNC: 0.5 MG/DL
DDIMER WHOLE BLOOD: 931 NG/ML DDU (ref ?–400)
GFR SERPLBLD BASED ON 1.73 SQ M-ARVRAT: 92 ML/MIN/1.73M2 (ref 60–?)
GLUCOSE BLD-MCNC: 86 MG/DL (ref 70–99)
GLUCOSE BLD-MCNC: 94 MG/DL (ref 70–99)
HCT VFR BLD AUTO: 39.8 %
HCT VFR BLD CALC: 39 %
HGB BLD-MCNC: 13.4 G/DL
ISTAT IONIZED CALCIUM FOR CHEM 8: 1.24 MMOL/L (ref 1.12–1.32)
LYMPHOCYTES # BLD AUTO: 1.8 X10ˆ3/UL (ref 1–4)
LYMPHOCYTES NFR BLD AUTO: 24.1 %
MCH RBC QN AUTO: 31.9 PG (ref 26–34)
MCHC RBC AUTO-ENTMCNC: 33.7 G/DL (ref 31–37)
MCV RBC AUTO: 94.8 FL (ref 80–100)
MIXED CELL %: 9.8 %
NEUTROPHILS # BLD AUTO: 4.9 X10ˆ3/UL (ref 1.5–7.7)
NEUTROPHILS NFR BLD AUTO: 66.1 %
PLATELET # BLD AUTO: 195 X10ˆ3/UL (ref 150–450)
POCT BILIRUBIN URINE: NEGATIVE
POCT GLUCOSE URINE: NEGATIVE MG/DL
POCT INFLUENZA A: NEGATIVE
POCT INFLUENZA B: NEGATIVE
POCT LEUKOCYTE ESTERASE URINE: NEGATIVE
POCT NITRITE URINE: NEGATIVE
POCT PH URINE: 6.5 (ref 5–8)
POCT PROTEIN URINE: NEGATIVE MG/DL
POCT SPECIFIC GRAVITY URINE: 1.02
POCT URINE CLARITY: CLEAR
POCT URINE COLOR: YELLOW
POCT UROBILINOGEN URINE: 0.2 MG/DL
POTASSIUM BLD-SCNC: 3.6 MMOL/L (ref 3.6–5.1)
RBC # BLD AUTO: 4.2 X10ˆ6/UL
SARS-COV-2 RNA RESP QL NAA+PROBE: NOT DETECTED
SODIUM BLD-SCNC: 141 MMOL/L (ref 136–145)
TROPONIN I BLD-MCNC: <0.02 NG/ML
WBC # BLD AUTO: 7.4 X10ˆ3/UL (ref 4–11)

## 2023-01-05 PROCEDURE — 80047 BASIC METABLC PNL IONIZED CA: CPT | Performed by: NURSE PRACTITIONER

## 2023-01-05 PROCEDURE — 87502 INFLUENZA DNA AMP PROBE: CPT | Performed by: NURSE PRACTITIONER

## 2023-01-05 PROCEDURE — 85025 COMPLETE CBC W/AUTO DIFF WBC: CPT | Performed by: NURSE PRACTITIONER

## 2023-01-05 PROCEDURE — 85378 FIBRIN DEGRADE SEMIQUANT: CPT | Performed by: NURSE PRACTITIONER

## 2023-01-05 PROCEDURE — 82962 GLUCOSE BLOOD TEST: CPT | Performed by: NURSE PRACTITIONER

## 2023-01-05 PROCEDURE — U0002 COVID-19 LAB TEST NON-CDC: HCPCS | Performed by: NURSE PRACTITIONER

## 2023-01-05 PROCEDURE — 81002 URINALYSIS NONAUTO W/O SCOPE: CPT | Performed by: NURSE PRACTITIONER

## 2023-01-05 PROCEDURE — 99215 OFFICE O/P EST HI 40 MIN: CPT | Performed by: NURSE PRACTITIONER

## 2023-01-05 PROCEDURE — 93000 ELECTROCARDIOGRAM COMPLETE: CPT | Performed by: NURSE PRACTITIONER

## 2023-01-05 PROCEDURE — 71046 X-RAY EXAM CHEST 2 VIEWS: CPT | Performed by: NURSE PRACTITIONER

## 2023-01-05 PROCEDURE — 84484 ASSAY OF TROPONIN QUANT: CPT | Performed by: NURSE PRACTITIONER

## 2023-01-05 NOTE — ED INITIAL ASSESSMENT (HPI)
Patient states woke up not feeling well- patient states 5 weeks post stroke.     Denies any chest pain

## 2023-01-06 LAB
ATRIAL RATE: 70 BPM
P AXIS: 39 DEGREES
P-R INTERVAL: 164 MS
Q-T INTERVAL: 406 MS
QRS DURATION: 94 MS
QTC CALCULATION (BEZET): 438 MS
R AXIS: 18 DEGREES
T AXIS: 57 DEGREES
VENTRICULAR RATE: 70 BPM

## 2023-01-13 ENCOUNTER — PATIENT MESSAGE (OUTPATIENT)
Dept: NEUROLOGY | Facility: CLINIC | Age: 83
End: 2023-01-13

## 2023-01-13 DIAGNOSIS — I63.9 ACUTE CVA (CEREBROVASCULAR ACCIDENT) (HCC): Primary | ICD-10-CM

## 2023-01-16 ENCOUNTER — TELEPHONE (OUTPATIENT)
Dept: PHYSICAL THERAPY | Facility: HOSPITAL | Age: 83
End: 2023-01-16

## 2023-01-16 ENCOUNTER — PATIENT MESSAGE (OUTPATIENT)
Dept: FAMILY MEDICINE CLINIC | Facility: CLINIC | Age: 83
End: 2023-01-16

## 2023-01-16 ENCOUNTER — OFFICE VISIT (OUTPATIENT)
Dept: FAMILY MEDICINE CLINIC | Facility: CLINIC | Age: 83
End: 2023-01-16
Payer: MEDICARE

## 2023-01-16 VITALS
WEIGHT: 131.81 LBS | HEART RATE: 74 BPM | TEMPERATURE: 99 F | SYSTOLIC BLOOD PRESSURE: 134 MMHG | BODY MASS INDEX: 21 KG/M2 | RESPIRATION RATE: 16 BRPM | DIASTOLIC BLOOD PRESSURE: 70 MMHG

## 2023-01-16 DIAGNOSIS — E78.00 PURE HYPERCHOLESTEROLEMIA: ICD-10-CM

## 2023-01-16 DIAGNOSIS — I10 PRIMARY HYPERTENSION: ICD-10-CM

## 2023-01-16 DIAGNOSIS — Z85.038 HX OF COLON CANCER, STAGE I: ICD-10-CM

## 2023-01-16 DIAGNOSIS — Z86.73 HX OF COMPLETED STROKE: ICD-10-CM

## 2023-01-16 DIAGNOSIS — R73.9 HYPERGLYCEMIA: ICD-10-CM

## 2023-01-16 DIAGNOSIS — I34.1 MVP (MITRAL VALVE PROLAPSE): ICD-10-CM

## 2023-01-16 DIAGNOSIS — R41.3 MEMORY DEFICIT: ICD-10-CM

## 2023-01-16 DIAGNOSIS — Z00.00 ENCOUNTER FOR ANNUAL HEALTH EXAMINATION: Primary | ICD-10-CM

## 2023-01-16 PROBLEM — I63.9 ACUTE CVA (CEREBROVASCULAR ACCIDENT) (HCC): Status: RESOLVED | Noted: 2022-11-26 | Resolved: 2023-01-16

## 2023-01-16 NOTE — TELEPHONE ENCOUNTER
From: Danny العراقي  To: Rodriguez Tucker MD  Sent: 1/13/2023 4:41 PM CST  Subject: Outpatient Occupational Therapy     Hi Dr Jakob Shaffer,     My mother, Danny العراقي, bd: 11/2/38 has finished home OT. Will you write her a prescription for Outpatient OT? We'd like to take here to [facility] in [place]. Thank you. Have a good day.     Annamaria Green   [phone]

## 2023-01-16 NOTE — TELEPHONE ENCOUNTER
From: Félix Hoover  To: Grant Carroll MD  Sent: 1/16/2023 2:16 PM CST  Subject: Clear captions approval form    Hi Dr Rinku Colón is the authorization form for government coverage of a close captioned phone for my parents. If you would sign and return at your convenience, it would be greatly appreciated. Thanks!     Leia Mendoza   [phone]

## 2023-01-16 NOTE — TELEPHONE ENCOUNTER
D/W Dr. Bojorquez Friends, [facility] to order outpatient OT. Order placed, patient/family notified via [employer].

## 2023-01-18 ENCOUNTER — OFFICE VISIT (OUTPATIENT)
Dept: OCCUPATIONAL MEDICINE | Facility: HOSPITAL | Age: 83
End: 2023-01-18
Attending: Other
Payer: MEDICARE

## 2023-01-18 DIAGNOSIS — I63.9 ACUTE CVA (CEREBROVASCULAR ACCIDENT) (HCC): ICD-10-CM

## 2023-01-18 PROCEDURE — 97110 THERAPEUTIC EXERCISES: CPT

## 2023-01-18 PROCEDURE — 97166 OT EVAL MOD COMPLEX 45 MIN: CPT

## 2023-01-27 ENCOUNTER — OFFICE VISIT (OUTPATIENT)
Dept: OCCUPATIONAL MEDICINE | Facility: HOSPITAL | Age: 83
End: 2023-01-27
Attending: Other
Payer: MEDICARE

## 2023-01-27 PROCEDURE — 97140 MANUAL THERAPY 1/> REGIONS: CPT

## 2023-01-27 PROCEDURE — 97530 THERAPEUTIC ACTIVITIES: CPT

## 2023-01-27 PROCEDURE — 97110 THERAPEUTIC EXERCISES: CPT

## 2023-01-31 ENCOUNTER — OFFICE VISIT (OUTPATIENT)
Dept: OCCUPATIONAL MEDICINE | Facility: HOSPITAL | Age: 83
End: 2023-01-31
Attending: Other
Payer: MEDICARE

## 2023-01-31 PROCEDURE — 97530 THERAPEUTIC ACTIVITIES: CPT

## 2023-01-31 PROCEDURE — 97110 THERAPEUTIC EXERCISES: CPT

## 2023-01-31 NOTE — PROGRESS NOTES
Diagnosis: Acute CVA (cerebrovascular accident) ([facility] [address] (I63.9)      Referring Provider: Tiff Delgadillo  Date of Evaluation:    1/18/2023    Precautions:  Poor Historian; Dementia; h/o CVA; Fall Risk Next MD visit:   none scheduled  Date of Surgery: n/a   [facility]         # Auth Visits: 3/12            Subjective: Patient presents to OT appt today noting achy pain throughout the left hand. Patient notes she has been completing HEP exercises sponge use vs. theraputty since last visit. Patient presents with a stiffness through R hand joints. Objective:     AROM (degrees): Pt BUE AROM WFL except noted below     Wrist   Flexion: R 65, L WFL  Extension: R 45, L WFL  Ulnar Deviation: R 15, L WFL  Radial Deviation R 5, L WFL      RIGHT HAND:     Thumb IF MF RF SF   MP 0-45 10-75 20-80 15-80 15-80   PIP IP=0-60 40-85 65-85 65-85 45-85   DIP   0-30 0-40 0-35 0-35   CHAUHAN 105 140 120 120 140      RUE Thumb   Palmar Abduction 0-35   Radial Abduction 0-35      MANUAL MUSCLE TESTING:   Shoulder Flexion: R=4-/5; L=4/5  Shoulder Abduction: R=4-/5; L=4/5  Shoulder External Rotation: R=4-/5; L=4/5  Shoulder Internal Rotation: R=4-/5; L=4/5  Elbow Flexion: R=4+/5; L=4+/5  Elbow Extension: R=4/5; L=4/5  Forearm Pronation:  R=4-/5; L=4-/5  Forearm Supination: R=4-/5; L=4-/5  Wrist Flexion: R=2+/5; L=4-/5  Wrist Extension: R=2+/5; L=4-/5     Strength (lbs) Right Average Left Average   : 3.0 lbs 8.0 lbs   2 pt Pinch: 0.25 lbs 2.0 lbs   3 pt Pinch: 0.5 lbs 3.0 lbs   Lateral Pinch: 0.5 lbs 8.0 lbs      SPECIAL TESTS:   Nine-Hole Peg Test: R=unable to complete (5 pegs in 2 min); L=36.9 sec  Rapid Alternating Movements (Pronation/Supination): RUE deficits noted  Finger-to-Nose Test: RUE deficits noted    Assessment: Patient progressing with OT plan of care today although with continued noted decreased ROM, coordination, and strength overall to RUE hand.  LUE hand performed much better than RUE hand in all motor, [address], and coordination tasks. Treatment today focused on ROM stretching, fine-motor tasks, and strengthening overall of digits. Patient would highly benefit from continued OT services at this time. Goals:  (to be met in 12 visits)  1. Patient will demonstrate at least 200 degrees R D2-D5 CHAUHAN indicating increased ability to grasp and  objects for daily IADL management. 2. Patient will demonstrate at least 7.0 lbs R  strength indicating increased ability to manage functional ADL/IADLs. 3. Patient will demonstrate at least 60 degrees R wrist extension indicating increased ability to manage functiona tenodesis response for ADL/IADLs. 4. Patient will be educated and implement use of adaptive equipment which can increase functional independence with self feeding activities. 5. Patient will demonstrate ability to complete Nine-Hole Peg Test within 3 min indicating increased [address] for ADL/IADL management. 6. Pt will be independent and compliant with comprehensive HEP to maintain progress achieved in OT. Plan: Patient will be seen for 2x/week or a total of 12 visits over a 90 day period. Treatment will include: Manual Therapy, Neuromuscular Re-education, Self-Care Home Management, Therapeutic Activities, Therapeutic Exercise and Home Exercise Program instruction, Splinting, Modalities PRN    Date: 1/27/2023  TX#: 2/12 Date: 1/31/2023                 TX#: 3/12 Date:                 TX#: 4/12   -5 min hot pack to bilateral hands for increased blood flow and prep for ROM  -RUE digit joint mobilization and PROM stretching of MCPs/PIPs/DIPs  -Yellow  Clip 3-Point Pinch sponge sorting activity  -Perfection FMC/In-Hand Manipulation and coordination sorting activity  -Yellow Digiflex Full  1 set of 10 reps each;  Individual Digit 1 set of 10 reps each -5 min hot pack to bilateral hands for increased blood flow and prep for ROM  -RUE digit joint mobilization and PROM stretching of MCPs/PIPs/DIPs  -Yellow  clip 3 point pinch pink and blue sponges sorting activity for L and R hand  -Yellow Digiflex Full  1 set of 10 reps each  - Peg board activity for B hands of inserting and removing pegs using 3 point and 2 point pinch  -chinese  sorting activity 2pt pinch L and R hand.        HEP:   -Yellow Theraputty Strengthening Exercises (1 set of 4 reps, 2-3x/day)    Charges: TEx1, TAx2       Total Timed Treatment: 45 min  Total Treatment Time: 45 min

## 2023-02-02 ENCOUNTER — OFFICE VISIT (OUTPATIENT)
Dept: OCCUPATIONAL MEDICINE | Facility: HOSPITAL | Age: 83
End: 2023-02-02
Attending: Other
Payer: MEDICARE

## 2023-02-02 PROCEDURE — 97530 THERAPEUTIC ACTIVITIES: CPT

## 2023-02-02 PROCEDURE — 97110 THERAPEUTIC EXERCISES: CPT

## 2023-02-02 NOTE — PROGRESS NOTES
Diagnosis: Acute CVA (cerebrovascular accident) ([facility] [address] (I63.9)      Referring Provider: Conchita Mendoza  Date of Evaluation:    1/18/2023    Precautions:  Poor Historian; Dementia; h/o CVA; Fall Risk Next MD visit:   none scheduled  Date of Surgery: n/a   [facility]         # Auth Visits: 4/12            Subjective: Patient presents to OT appt today noting achy pain throughout the left hand. Patient notes she has been completing HEP exercises sponge use vs. theraputty since last visit. Patient presents with a stiffness through R hand joints, primarily in digits. Pt stated that R elbow was becoming sore during perfection game.      Objective:     AROM (degrees): Pt BUE AROM WFL except noted below     Wrist   Flexion: R 65, L WFL  Extension: R 45, L WFL  Ulnar Deviation: R 15, L WFL  Radial Deviation R 5, L WFL      RIGHT HAND:     Thumb IF MF RF SF   MP 0-45 10-75 20-80 15-80 15-80   PIP IP=0-60 40-85 65-85 65-85 45-85   DIP   0-30 0-40 0-35 0-35   CHAUHAN 105 140 120 120 140      RUE Thumb   Palmar Abduction 0-35   Radial Abduction 0-35      MANUAL MUSCLE TESTING:   Shoulder Flexion: R=4-/5; L=4/5  Shoulder Abduction: R=4-/5; L=4/5  Shoulder External Rotation: R=4-/5; L=4/5  Shoulder Internal Rotation: R=4-/5; L=4/5  Elbow Flexion: R=4+/5; L=4+/5  Elbow Extension: R=4/5; L=4/5  Forearm Pronation:  R=4-/5; L=4-/5  Forearm Supination: R=4-/5; L=4-/5  Wrist Flexion: R=2+/5; L=4-/5  Wrist Extension: R=2+/5; L=4-/5     Strength (lbs) Right Average Left Average   : 3.0 lbs 8.0 lbs   2 pt Pinch: 0.25 lbs 2.0 lbs   3 pt Pinch: 0.5 lbs 3.0 lbs   Lateral Pinch: 0.5 lbs 8.0 lbs      SPECIAL TESTS:   Nine-Hole Peg Test: R=unable to complete (5 pegs in 2 min); L=36.9 sec  Rapid Alternating Movements (Pronation/Supination): RUE deficits noted  Finger-to-Nose Test: RUE deficits noted    Assessment: Patient progressing with OT plan of care today although with continued noted decreased ROM, increased stiffnes, reduced coordination, and strength overall to RUE hand. LUE hand performed much better than RUE hand in all motor, [facility] and coordination tasks. Treatment today focused on ROM stretching of the digits, fine-motor tasks, and strengthening overall of digits and forearm. Patient would highly benefit from continued OT services at this time. Goals:  (to be met in 12 visits)  1. Patient will demonstrate at least 200 degrees R D2-D5 CHAUHAN indicating increased ability to grasp and  objects for daily IADL management. 2. Patient will demonstrate at least 7.0 lbs R  strength indicating increased ability to manage functional ADL/IADLs. 3. Patient will demonstrate at least 60 degrees R wrist extension indicating increased ability to manage functiona tenodesis response for ADL/IADLs. 4. Patient will be educated and implement use of adaptive equipment which can increase functional independence with self feeding activities. 5. Patient will demonstrate ability to complete Nine-Hole Peg Test within 3 min indicating increased [facility] for ADL/IADL management. 6. Pt will be independent and compliant with comprehensive HEP to maintain progress achieved in OT. Plan: Patient will be seen for 2x/week or a total of 12 visits over a 90 day period. Treatment will include: Manual Therapy, Neuromuscular Re-education, Self-Care Home Management, Therapeutic Activities, Therapeutic Exercise and Home Exercise Program instruction, Splinting, Modalities PRN    Date: 1/27/2023  TX#: 2/12 Date: 1/31/2023                 TX#: 3/12 Date: 2/2/2023                TX#: 4/12   -5 min hot pack to bilateral hands for increased blood flow and prep for ROM  -RUE digit joint mobilization and PROM stretching of MCPs/PIPs/DIPs  -Yellow  Clip 3-Point Pinch sponge sorting activity  -Perfection FMC/In-Hand Manipulation and coordination sorting activity  -Yellow Digiflex Full  1 set of 10 reps each;  Individual Digit 1 set of 10 reps each -5 min hot pack to bilateral hands for increased blood flow and prep for ROM  -RUE digit joint mobilization and PROM stretching of MCPs/PIPs/DIPs  -Yellow  clip 3 point pinch pink and blue sponges sorting activity for L and R hand  -Yellow Digiflex Full  1 set of 10 reps each  - Peg board activity for B hands of inserting and removing pegs using 3 point and 2 point pinch  -chinese  sorting activity 2pt pinch L and R hand. -5 min hot pack to B hands for increased blood flow and prep for ROM  -RUE digit joint mobilization and PROM stretching of MCPs/PIPs/DIPs  -Yellow 3pt pinch clip to  10 flat marbles 2x10  -Red digiflex full  1x10  -Yellow digiflx individual digit compression 1x5  -Perfection games 2 pt pinch placing and removal   -Yellow flexbar partial pronation 1x10 with therapist assistance.           HEP:   -Yellow Theraputty Strengthening Exercises (1 set of 4 reps, 2-3x/day)    Charges: TEx1, TAx2       Total Timed Treatment: 45 min  Total Treatment Time: 45 min

## 2023-02-06 ENCOUNTER — OFFICE VISIT (OUTPATIENT)
Dept: OCCUPATIONAL MEDICINE | Facility: HOSPITAL | Age: 83
End: 2023-02-06
Attending: Other
Payer: MEDICARE

## 2023-02-06 PROCEDURE — 97110 THERAPEUTIC EXERCISES: CPT

## 2023-02-06 PROCEDURE — 97140 MANUAL THERAPY 1/> REGIONS: CPT

## 2023-02-06 PROCEDURE — 97530 THERAPEUTIC ACTIVITIES: CPT

## 2023-02-06 NOTE — PROGRESS NOTES
Diagnosis: Acute CVA (cerebrovascular accident) ([facility] [address] (I63.9)      Referring Provider: Justice Gutierrez  Date of Evaluation:    1/18/2023    Precautions:  Poor Historian; Dementia; h/o CVA; Fall Risk Next MD visit:   none scheduled  Date of Surgery: n/a   [facility]         # Auth Visits: 5/12            Subjective: Patient presents to OT appt today noting R hand was causing \"pain and trouble. \" Patient notes she has been completing HEP exercises w/ sponge use vs. theraputty since last visit. Patient presents with a stiffness through R hand joints, primarily in digits during extension.      Objective:     AROM (degrees): Pt BUE AROM WFL except noted below     Wrist   Flexion: R 65, L WFL  Extension: R 45, L WFL  Ulnar Deviation: R 15, L WFL  Radial Deviation R 5, L WFL      RIGHT HAND:     Thumb IF MF RF SF   MP 0-45 10-75 20-80 15-80 15-80   PIP IP=0-60 40-85 65-85 65-85 45-85   DIP   0-30 0-40 0-35 0-35   CHAUHAN 105 140 120 120 140      RUE Thumb   Palmar Abduction 0-35   Radial Abduction 0-35      MANUAL MUSCLE TESTING:   Shoulder Flexion: R=4-/5; L=4/5  Shoulder Abduction: R=4-/5; L=4/5  Shoulder External Rotation: R=4-/5; L=4/5  Shoulder Internal Rotation: R=4-/5; L=4/5  Elbow Flexion: R=4+/5; L=4+/5  Elbow Extension: R=4/5; L=4/5  Forearm Pronation:  R=4-/5; L=4-/5  Forearm Supination: R=4-/5; L=4-/5  Wrist Flexion: R=2+/5; L=4-/5  Wrist Extension: R=2+/5; L=4-/5     Strength (lbs) Right Average Left Average   : 3.0 lbs 8.0 lbs   2 pt Pinch: 0.25 lbs 2.0 lbs   3 pt Pinch: 0.5 lbs 3.0 lbs   Lateral Pinch: 0.5 lbs 8.0 lbs      SPECIAL TESTS:   Nine-Hole Peg Test: R=unable to complete (5 pegs in 2 min); L=36.9 sec  Rapid Alternating Movements (Pronation/Supination): RUE deficits noted  Finger-to-Nose Test: RUE deficits noted    Assessment: Patient progressing with OT plan of care today although with continued noted decreased ROM, increased stiffnes, reduced coordination, and strength overall to RUE hand. LUE hand performed much better than RUE hand in all motor, [facility] and coordination tasks. Treatment today focused on ROM stretching of the digits, in which the PIP joints of D2-D5 prevented heavy stiffness during stretching, primarily during stretching into extension. [facility] and pinch strength exercises to the R hand also caused fatigue, indicating lack of endurance. Patient would highly benefit from continued OT services at this time. Goals:  (to be met in 12 visits)  1. Patient will demonstrate at least 200 degrees R D2-D5 CHAUHAN indicating increased ability to grasp and  objects for daily IADL management. 2. Patient will demonstrate at least 7.0 lbs R  strength indicating increased ability to manage functional ADL/IADLs. 3. Patient will demonstrate at least 60 degrees R wrist extension indicating increased ability to manage functiona tenodesis response for ADL/IADLs. 4. Patient will be educated and implement use of adaptive equipment which can increase functional independence with self feeding activities. 5. Patient will demonstrate ability to complete Nine-Hole Peg Test within 3 min indicating increased [facility] for ADL/IADL management. 6. Pt will be independent and compliant with comprehensive HEP to maintain progress achieved in OT. Plan: Patient will be seen for 2x/week or a total of 12 visits over a 90 day period.   Treatment will include: Manual Therapy, Neuromuscular Re-education, Self-Care Home Management, Therapeutic Activities, Therapeutic Exercise and Home Exercise Program instruction, Splinting, Modalities PRN    Date: 1/27/2023  TX#: 2/12 Date: 1/31/2023                 TX#: 3/12 Date: 2/2/2023                TX#: 4/12 Date: 2/6/2023  Tx#: 5/12   -5 min hot pack to bilateral hands for increased blood flow and prep for ROM  -RUE digit joint mobilization and PROM stretching of MCPs/PIPs/DIPs  -Yellow  Clip 3-Point Pinch sponge sorting activity  -Perfection FMC/In-Hand Manipulation and coordination sorting activity  -Yellow Digiflex Full  1 set of 10 reps each; Individual Digit 1 set of 10 reps each -5 min hot pack to bilateral hands for increased blood flow and prep for ROM  -RUE digit joint mobilization and PROM stretching of MCPs/PIPs/DIPs  -Yellow  clip 3 point pinch pink and blue sponges sorting activity for L and R hand  -Yellow Digiflex Full  1 set of 10 reps each  - Peg board activity for B hands of inserting and removing pegs using 3 point and 2 point pinch  -chinese  sorting activity 2pt pinch L and R hand. -5 min hot pack to B hands for increased blood flow and prep for ROM  -RUE digit joint mobilization and PROM stretching of MCPs/PIPs/DIPs  -Yellow 3pt pinch clip to  10 flat marbles 2x10  -Red digiflex full  1x10  -Yellow digiflx individual digit compression 1x5  -Perfection games 2 pt pinch placing and removal   -Yellow flexbar partial pronation 1x10 with therapist assistance.       -5 min hot pack to B hands for increased blood flow and prep for ROM  -RUE digit joint mobilization and PROM stretching of MCPs/PIPs/DIPs  -Yellow theraputty exercises for D2-D5 gripping & flexion, thumb flexion, and opposition strength.  -Round marbles 2pt pinch  for increase in pinch strength 2x15 marbles  -Yellow flexbar partial supination and pronation with therapist assist 1x5  -Yellow digiflex individual digits 1x5     HEP:   -Yellow Theraputty Strengthening Exercises (1 set of 4 reps, 2-3x/day)    Charges: MTx1 TEx1 Tax1       Total Timed Treatment: 45 min  Total Treatment Time: 45 min

## 2023-02-07 NOTE — PROGRESS NOTES
Diagnosis: Acute CVA (cerebrovascular accident) ([facility] [address] (I63.9)      Referring Provider: Damaris Hernandez  Date of Evaluation:    1/18/2023    Precautions:  Poor Historian; Dementia; h/o CVA; Fall Risk Next MD visit:   none scheduled  Date of Surgery: n/a   [facility]         # Auth Visits: 5/12            Subjective: Patient presents to OT appt today noting R hand was causing \"pain and trouble. \" Patient notes she has been completing HEP exercises w/ sponge use vs. theraputty since last visit. Patient presents with a stiffness through R hand joints, primarily in digits during extension.      Objective:     AROM (degrees): Pt BUE AROM WFL except noted below     Wrist   Flexion: R 65, L WFL  Extension: R 45, L WFL  Ulnar Deviation: R 15, L WFL  Radial Deviation R 5, L WFL      RIGHT HAND:     Thumb IF MF RF SF   MP 0-45 10-75 20-80 15-80 15-80   PIP IP=0-60 40-85 65-85 65-85 45-85   DIP   0-30 0-40 0-35 0-35   CHAUHAN 105 140 120 120 140      RUE Thumb   Palmar Abduction 0-35   Radial Abduction 0-35      MANUAL MUSCLE TESTING:   Shoulder Flexion: R=4-/5; L=4/5  Shoulder Abduction: R=4-/5; L=4/5  Shoulder External Rotation: R=4-/5; L=4/5  Shoulder Internal Rotation: R=4-/5; L=4/5  Elbow Flexion: R=4+/5; L=4+/5  Elbow Extension: R=4/5; L=4/5  Forearm Pronation:  R=4-/5; L=4-/5  Forearm Supination: R=4-/5; L=4-/5  Wrist Flexion: R=2+/5; L=4-/5  Wrist Extension: R=2+/5; L=4-/5     Strength (lbs) Right Average Left Average   : 3.0 lbs 8.0 lbs   2 pt Pinch: 0.25 lbs 2.0 lbs   3 pt Pinch: 0.5 lbs 3.0 lbs   Lateral Pinch: 0.5 lbs 8.0 lbs      SPECIAL TESTS:   Nine-Hole Peg Test: R=unable to complete (5 pegs in 2 min); L=36.9 sec  Rapid Alternating Movements (Pronation/Supination): RUE deficits noted  Finger-to-Nose Test: RUE deficits noted    Assessment: Patient progressing with OT plan of care today although with continued noted decreased ROM, increased stiffnes, reduced coordination, and strength overall to RUE hand. LUE hand performed much better than RUE hand in all motor, [facility] and coordination tasks. Treatment today focused on ROM stretching of the digits, in which the PIP joints of D2-D5 prevented heavy stiffness during stretching, primarily during stretching into extension. [facility] and pinch strength exercises to the R hand also caused fatigue, indicating lack of endurance. Patient would highly benefit from continued OT services at this time. Goals:  (to be met in 12 visits)  1. Patient will demonstrate at least 200 degrees R D2-D5 CHAUHAN indicating increased ability to grasp and  objects for daily IADL management. 2. Patient will demonstrate at least 7.0 lbs R  strength indicating increased ability to manage functional ADL/IADLs. 3. Patient will demonstrate at least 60 degrees R wrist extension indicating increased ability to manage functiona tenodesis response for ADL/IADLs. 4. Patient will be educated and implement use of adaptive equipment which can increase functional independence with self feeding activities. 5. Patient will demonstrate ability to complete Nine-Hole Peg Test within 3 min indicating increased [facility] for ADL/IADL management. 6. Pt will be independent and compliant with comprehensive HEP to maintain progress achieved in OT. Plan: Patient will be seen for 2x/week or a total of 12 visits over a 90 day period.   Treatment will include: Manual Therapy, Neuromuscular Re-education, Self-Care Home Management, Therapeutic Activities, Therapeutic Exercise and Home Exercise Program instruction, Splinting, Modalities PRN    Date: 1/27/2023  TX#: 2/12 Date: 1/31/2023                 TX#: 3/12 Date: 2/2/2023                TX#: 4/12 Date: 2/6/2023  Tx#: 5/12    -5 min hot pack to bilateral hands for increased blood flow and prep for ROM  -RUE digit joint mobilization and PROM stretching of MCPs/PIPs/DIPs  -Yellow  Clip 3-Point Pinch sponge sorting activity  -Perfection FMC/In-Hand Manipulation and coordination sorting activity  -Yellow Digiflex Full  1 set of 10 reps each; Individual Digit 1 set of 10 reps each -5 min hot pack to bilateral hands for increased blood flow and prep for ROM  -RUE digit joint mobilization and PROM stretching of MCPs/PIPs/DIPs  -Yellow  clip 3 point pinch pink and blue sponges sorting activity for L and R hand  -Yellow Digiflex Full  1 set of 10 reps each  - Peg board activity for B hands of inserting and removing pegs using 3 point and 2 point pinch  -chinese  sorting activity 2pt pinch L and R hand. -5 min hot pack to B hands for increased blood flow and prep for ROM  -RUE digit joint mobilization and PROM stretching of MCPs/PIPs/DIPs  -Yellow 3pt pinch clip to  10 flat marbles 2x10  -Red digiflex full  1x10  -Yellow digiflx individual digit compression 1x5  -Perfection games 2 pt pinch placing and removal   -Yellow flexbar partial pronation 1x10 with therapist assistance. -5 min hot pack to B hands for increased blood flow and prep for ROM  -RUE digit joint mobilization and PROM stretching of MCPs/PIPs/DIPs  -Yellow theraputty exercises for D2-D5 gripping & flexion, thumb flexion, and opposition strength.  -Round marbles 2pt pinch  for increase in pinch strength 2x15 marbles  -Yellow flexbar partial supination and pronation with therapist assist 1x5  -Yellow digiflex individual digits 1x5 5 min hot pack to B hands for increased blood flow and prep for ROM  -RUE digit joint mobilization and PROM stretching of MCPs/PIPs/DIPs  -Yellow theraputty exercises for D2-D5 gripping & flexion, thumb flexion, and opposition strength.   -Yellow 3pt pinch clip to  10 flat marbles 2x10     HEP:   -Yellow Theraputty Strengthening Exercises (1 set of 4 reps, 2-3x/day)    Charges: MTx1 TEx1 Tax1       Total Timed Treatment: 45 min  Total Treatment Time: 45 min

## 2023-02-08 ENCOUNTER — OFFICE VISIT (OUTPATIENT)
Dept: OCCUPATIONAL MEDICINE | Facility: HOSPITAL | Age: 83
End: 2023-02-08
Attending: Other
Payer: MEDICARE

## 2023-02-08 PROCEDURE — 97140 MANUAL THERAPY 1/> REGIONS: CPT

## 2023-02-08 PROCEDURE — 97110 THERAPEUTIC EXERCISES: CPT

## 2023-02-08 PROCEDURE — 97530 THERAPEUTIC ACTIVITIES: CPT

## 2023-02-08 NOTE — PROGRESS NOTES
Diagnosis: Acute CVA (cerebrovascular accident) ([facility] [address] (I63.9)      Referring Provider: Luis Olea  Date of Evaluation:    1/18/2023    Precautions:  Poor Historian; Dementia; h/o CVA; Fall Risk Next MD visit:   none scheduled  Date of Surgery: n/a   Insurance Primary/Secondary: Madelin Parra [facility]         # Auth Visits: 6/12            Subjective: Patient presents to OT appt today noting R hand was causing \"pain and trouble. \" Patient notes she has been completing HEP exercises w/ sponge use and yellow theraputty since last visit. Patient presents with a stiffness through R hand joints, primarily in PIP digits during extension.      Objective:     AROM (degrees): Pt BUE AROM WFL except noted below     Wrist   Flexion: R 65, L WFL  Extension: R 45, L WFL  Ulnar Deviation: R 15, L WFL  Radial Deviation R 5, L WFL      RIGHT HAND:     Thumb IF MF RF SF   MP 0-45 10-75 20-80 15-80 15-80   PIP IP=0-60 40-85 65-85 65-85 45-85   DIP   0-30 0-40 0-35 0-35   CHAUHAN 105 140 120 120 140      RUE Thumb   Palmar Abduction 0-35   Radial Abduction 0-35      MANUAL MUSCLE TESTING:   Shoulder Flexion: R=4-/5; L=4/5  Shoulder Abduction: R=4-/5; L=4/5  Shoulder External Rotation: R=4-/5; L=4/5  Shoulder Internal Rotation: R=4-/5; L=4/5  Elbow Flexion: R=4+/5; L=4+/5  Elbow Extension: R=4/5; L=4/5  Forearm Pronation:  R=4-/5; L=4-/5  Forearm Supination: R=4-/5; L=4-/5  Wrist Flexion: R=2+/5; L=4-/5  Wrist Extension: R=2+/5; L=4-/5     Strength (lbs) Right Average Left Average   : 3.0 lbs 8.0 lbs   2 pt Pinch: 0.25 lbs 2.0 lbs   3 pt Pinch: 0.5 lbs 3.0 lbs   Lateral Pinch: 0.5 lbs 8.0 lbs      SPECIAL TESTS:   Nine-Hole Peg Test: R=unable to complete (5 pegs in 2 min); L=36.9 sec  Rapid Alternating Movements (Pronation/Supination): RUE deficits noted  Finger-to-Nose Test: RUE deficits noted    Assessment: Patient progressing with OT plan of care today although with continued noted decreased extension in the the PIP of D2-D5, increased stiffnes, reduced coordination and strength overall to RUE hand. LUE hand performed much better than RUE hand in all motor, [facility] and coordination tasks. Treatment today focused on ROM stretching of the digits, [facility], and coordination. [facility] and pinch strength exercises to the R hand also caused fatigue, indicating lack of endurance. Patient would highly benefit from continued OT services at this time. Goals:  (to be met in 12 visits)  1. Patient will demonstrate at least 200 degrees R D2-D5 CHAUHAN indicating increased ability to grasp and  objects for daily IADL management. 2. Patient will demonstrate at least 7.0 lbs R  strength indicating increased ability to manage functional ADL/IADLs. 3. Patient will demonstrate at least 60 degrees R wrist extension indicating increased ability to manage functiona tenodesis response for ADL/IADLs. 4. Patient will be educated and implement use of adaptive equipment which can increase functional independence with self feeding activities. 5. Patient will demonstrate ability to complete Nine-Hole Peg Test within 3 min indicating increased [facility] for ADL/IADL management. 6. Pt will be independent and compliant with comprehensive HEP to maintain progress achieved in OT. Plan: Patient will be seen for 2x/week or a total of 12 visits over a 90 day period.   Treatment will include: Manual Therapy, Neuromuscular Re-education, Self-Care Home Management, Therapeutic Activities, Therapeutic Exercise and Home Exercise Program instruction, Splinting, Modalities PRN    Date: 2/2/2023                TX#: 4/12 Date: 2/6/2023  Tx#: 5/12 Date: 2/8/2023  TX#: 6/12   -5 min hot pack to B hands for increased blood flow and prep for ROM  -RUE digit joint mobilization and PROM stretching of MCPs/PIPs/DIPs  -Yellow 3pt pinch clip to  10 flat marbles 2x10  -Red digiflex full  1x10  -Yellow digiflx individual digit compression 1x5  -Perfection games 2 pt pinch placing and removal   -Yellow flexbar partial pronation 1x10 with therapist assistance. -5 min hot pack to B hands for increased blood flow and prep for ROM  -RUE digit joint mobilization and PROM stretching of MCPs/PIPs/DIPs  -Yellow theraputty exercises for D2-D5 gripping & flexion, thumb flexion, and opposition strength.  -Round marbles 2pt pinch  for increase in pinch strength 2x15 marbles  -Yellow flexbar partial supination and pronation with therapist assist 1x5  -Yellow digiflex individual digits 1x5 -5 min hot pack to B hands for increased blood flow and prep for ROM  -RUE digit joint mobilization and PROM stretching of MCPs/PIPs/DIPs  - Yellow theraputty exercises for gripping with D2-D5, Thumb flexion, opposition strength, abduction strength exercise, digit extension exercise 1 set of 3 reps   - use of yellow clip and mancala board with flat marbles using a 3pt pinch to apply 2 marbles to a [place], 2pt pinch removal of marbles  - yellow digigrip full  1setx 10reps R hand, 1 set x 5 reps yellow digigrip individual digits.             HEP:   -Yellow Theraputty Strengthening Exercises (1 set of 4 reps, 2-3x/day)    Charges: MTx1 TEx1 TAx1       Total Timed Treatment: 45 min  Total Treatment Time: 45 min

## 2023-02-13 ENCOUNTER — OFFICE VISIT (OUTPATIENT)
Dept: OCCUPATIONAL MEDICINE | Facility: HOSPITAL | Age: 83
End: 2023-02-13
Attending: Other
Payer: MEDICARE

## 2023-02-13 PROCEDURE — 97140 MANUAL THERAPY 1/> REGIONS: CPT

## 2023-02-13 PROCEDURE — 97110 THERAPEUTIC EXERCISES: CPT

## 2023-02-13 PROCEDURE — 97530 THERAPEUTIC ACTIVITIES: CPT

## 2023-02-13 NOTE — PROGRESS NOTES
Diagnosis: Acute CVA (cerebrovascular accident) ([facility] [address] (I63.9)      Referring Provider: Angel Gold  Date of Evaluation:    1/18/2023    Precautions:  Poor Historian; Dementia; h/o CVA; Fall Risk Next MD visit:   none scheduled  Date of Surgery: n/a   [facility]         # Auth Visits: 7/12            Subjective: Patient presents to OT appt today noting R hand was causing \"pain and trouble. \" When further questioned, ipt reports it as an achy weakness. Patient notes she has forgotten HEP exercises on some days w/ yellow sponge and yellow theraputty since last visit. Patient presents with a stiffness through R hand joints, primarily in PIP digits during extension.      Objective:     AROM (degrees): Pt BUE AROM WFL except noted below     Wrist   Flexion: R 65, L WFL  Extension: R 45, L WFL  Ulnar Deviation: R 15, L WFL  Radial Deviation R 5, L WFL      RIGHT HAND:     Thumb IF MF RF SF   MP 0-45 10-75 20-80 15-80 15-80   PIP IP=0-60 40-85 65-85 65-85 45-85   DIP   0-30 0-40 0-35 0-35   CHAUHAN 105 140 120 120 140      RUE Thumb   Palmar Abduction 0-35   Radial Abduction 0-35      MANUAL MUSCLE TESTING:   Shoulder Flexion: R=4-/5; L=4/5  Shoulder Abduction: R=4-/5; L=4/5  Shoulder External Rotation: R=4-/5; L=4/5  Shoulder Internal Rotation: R=4-/5; L=4/5  Elbow Flexion: R=4+/5; L=4+/5  Elbow Extension: R=4/5; L=4/5  Forearm Pronation:  R=4-/5; L=4-/5  Forearm Supination: R=4-/5; L=4-/5  Wrist Flexion: R=2+/5; L=4-/5  Wrist Extension: R=2+/5; L=4-/5     Strength (lbs) Right Average Left Average   : 3.0 lbs 8.0 lbs   2 pt Pinch: 0.25 lbs 2.0 lbs   3 pt Pinch: 0.5 lbs 3.0 lbs   Lateral Pinch: 0.5 lbs 8.0 lbs      SPECIAL TESTS:   Nine-Hole Peg Test: R=unable to complete (5 pegs in 2 min); L=36.9 sec  Rapid Alternating Movements (Pronation/Supination): RUE deficits noted  Finger-to-Nose Test: RUE deficits noted    Assessment: Patient progressing with OT plan of care today although with continued noted decreased extension in the the PIP of D2-D5, increased stiffness, reduced coordination and strength overall to RUE hand. LUE hand performed much better than RUE hand in all motor, [facility] and coordination tasks. Treatment today focused on ROM stretching of the digits, [facility], hand and digit strengthening and coordination. [facility] and pinch strength exercises to B hands also caused fatigue, indicating lack of endurance. Patient would highly benefit from continued OT services at this time. Goals:  (to be met in 12 visits)  1. Patient will demonstrate at least 200 degrees R D2-D5 CHAUHAN indicating increased ability to grasp and  objects for daily IADL management. 2. Patient will demonstrate at least 7.0 lbs R  strength indicating increased ability to manage functional ADL/IADLs. 3. Patient will demonstrate at least 60 degrees R wrist extension indicating increased ability to manage functiona tenodesis response for ADL/IADLs. 4. Patient will be educated and implement use of adaptive equipment which can increase functional independence with self feeding activities. 5. Patient will demonstrate ability to complete Nine-Hole Peg Test within 3 min indicating increased [facility] for ADL/IADL management. 6. Pt will be independent and compliant with comprehensive HEP to maintain progress achieved in OT. Plan: Patient will continue to be seen for 2x/week or a total of 12 visits over a 90 day period.   Treatment will include: Manual Therapy, Neuromuscular Re-education, Self-Care Home Management, Therapeutic Activities, Therapeutic Exercise and Home Exercise Program instruction, Splinting, Modalities PRN    Date: 2/2/2023                TX#: 4/12 Date: 2/6/2023  Tx#: 5/12 Date: 2/8/2023  TX#: 6/12 Date: 2/13/2023  TX#: 7/12   -5 min hot pack to B hands for increased blood flow and prep for ROM  -RUE digit joint mobilization and PROM stretching of MCPs/PIPs/DIPs  -Yellow 3pt pinch clip to  10 flat marbles 2x10  -Red digiflex full  1x10  -Yellow digiflx individual digit compression 1x5  -Perfection games 2 pt pinch placing and removal   -Yellow flexbar partial pronation 1x10 with therapist assistance. -5 min hot pack to B hands for increased blood flow and prep for ROM  -RUE digit joint mobilization and PROM stretching of MCPs/PIPs/DIPs  -Yellow theraputty exercises for D2-D5 gripping & flexion, thumb flexion, and opposition strength.  -Round marbles 2pt pinch  for increase in pinch strength 2x15 marbles  -Yellow flexbar partial supination and pronation with therapist assist 1x5  -Yellow digiflex individual digits 1x5 -5 min hot pack to B hands for increased blood flow and prep for ROM  -RUE digit joint mobilization and PROM stretching of MCPs/PIPs/DIPs  - Yellow theraputty exercises for gripping with D2-D5, Thumb flexion, opposition strength, abduction strength exercise, digit extension exercise 1 set of 3 reps   - use of yellow clip and mancala board with flat marbles using a 3pt pinch to apply 2 marbles to a [place], 2pt pinch removal of marbles  - yellow digigrip full  1setx 10reps R hand, 1 set x 5 reps yellow digigrip individual digits.         -5 min hot pack to B hands for increased blood flow and prep for ROM  -BUE digit joint mobilization and PROM stretching of MCPs/PIPs/DIPs  - pink sponges  with R and L hand using thermoplast intrinsic  bar  - Velcro checkerboard R hand removal and L hand replacement both with 3 pt pinch  - Mini connect 4 using 2 pinch B hands  - Yellow digiflex full  1x8 R hand   -extension glove R hand 1x5  -Review of HEP     HEP:   -Yellow Theraputty/Yellow Sponges Strengthening Exercises (1 set of 4 reps, 2-3x/day)    Charges: Mtx1, Tex1, TAx1       Total Timed Treatment: 45 min  Total Treatment Time: 45 min

## 2023-02-15 ENCOUNTER — OFFICE VISIT (OUTPATIENT)
Dept: OCCUPATIONAL MEDICINE | Facility: HOSPITAL | Age: 83
End: 2023-02-15
Attending: Other
Payer: MEDICARE

## 2023-02-15 PROCEDURE — 97140 MANUAL THERAPY 1/> REGIONS: CPT

## 2023-02-15 PROCEDURE — 97110 THERAPEUTIC EXERCISES: CPT

## 2023-02-15 PROCEDURE — 97530 THERAPEUTIC ACTIVITIES: CPT

## 2023-02-15 NOTE — PROGRESS NOTES
Discharge Summary  Diagnosis: Acute CVA (cerebrovascular accident) [facility]) (I63.9)      Referring Provider: Barbra Murillo  Date of Evaluation:    1/18/2023    Precautions:  Poor Historian; Dementia; h/o CVA; Fall Risk Next MD visit:   none scheduled  Date of Surgery: n/a   Insurance Primary/Secondary: [employer]         # Auth Visits: 8/12        Pt has attended 8 visits in Occupational Therapy. Subjective: Patient presents to OT appt today accompanied by pt  Florence White who remained in lobby during treatment. Patient reports she has been using her hands more and notes no pain today. Assessment: Patient has demonstrated steady progress with OT goals during plan of care at this time. Patient noted with improvements in digit AROM for RUE although with some limitations to PIP/DIP joints noted more related to possible arthritic joints. However, patient noted with at least 40-50 degrees increased CHAUHAN for R D2-D5. Patient also noted with improvements in R  strength from 3.0 lbs to 10.0 lbs measured today alongside noted improvements in pinch strength measurements across the board as well. Patient was initially unable to  a single peg to complete Nine-Hole Peg Test for RUE at beginning of POC but currently was able to finish assessment with a time of 76.2 sec for RUE today. Patient is progressing well and would be appropriate to finish out established POC for OT at this time.     Objective:     AROM (degrees): Pt BUE AROM WFL except noted below     Wrist   Flexion: R 65, L WFL  Extension: R 50, L WFL  Ulnar Deviation: R 15, L WFL  Radial Deviation R 5, L WFL      RIGHT HAND:     Thumb IF MF RF SF   MP 0-45 0-75 0-80 0-80 0-80   PIP IP=0-60 20-85 40-85 25-85 25-85   DIP   0-45 0-40 0-35 0-45   CHAUHAN 105 185 165 175 185      RUE Thumb   Palmar Abduction 0-45   Radial Abduction 0-45      MANUAL MUSCLE TESTING:   Shoulder Flexion: R=4-/5; L=4/5  Shoulder Abduction: R=4-/5; L=4/5  Shoulder External Rotation: R=4-/5; L=4/5  Shoulder Internal Rotation: R=4-/5; L=4/5  Elbow Flexion: R=4+/5; L=4+/5  Elbow Extension: R=4/5; L=4/5  Forearm Pronation:  R=4-/5; L=4-/5  Forearm Supination: R=4-/5; L=4-/5  Wrist Flexion: R=3+/5; L=4-/5  Wrist Extension: R=3+/5; L=4-/5     Strength (lbs) Right Average Left Average   : 10.0 lbs 13.0 lbs   2 pt Pinch: 1.0 lbs 4.0 lbs   3 pt Pinch: 2.0 lbs 4.0 lbs   Lateral Pinch: 5.0 lbs 9.0 lbs      SPECIAL TESTS:   Nine-Hole Peg Test: R=76.2 sec; L=36.9 sec  Rapid Alternating Movements (Pronation/Supination): RUE deficits noted  Finger-to-Nose Test: RUE deficits noted    Goals: (to be met in 12 visits)  1. Patient will demonstrate at least 200 degrees R D2-D5 CHAUHAN indicating increased ability to grasp and  objects for daily IADL management. PROGRESSING  2. Patient will demonstrate at least 7.0 lbs R  strength indicating increased ability to manage functional ADL/IADLs. MET  3. Patient will demonstrate at least 60 degrees R wrist extension indicating increased ability to manage functiona tenodesis response for ADL/IADLs. PROGRESSING  4. Patient will be educated and implement use of adaptive equipment which can increase functional independence with self feeding activities. MET  5. Patient will demonstrate ability to complete Nine-Hole Peg Test within 3 min indicating increased [facility] for ADL/IADL management. MET  6. Patient will be independent and compliant with comprehensive HEP to maintain progress achieved in OT. MET/PROGRESSING  7. Patient will demonstrate at least equal bilateral  strength indicating increased ability to manage functional ADL/IADLs. NEW GOAL  8. Patient will demonstrate at least 60.0 sec or less for RUE Nine-Hole Peg Test completion indicating increased [facility] for IADL management. NEW GOAL    Rehab Potential: good    Plan: Patient will continue to be seen for 2x/week or a total of 12 visits over a 90 day period.   Treatment will include: Manual Therapy, Neuromuscular Re-education, Self-Care Home Management, Therapeutic Activities, Therapeutic Exercise and Home Exercise Program instruction, Splinting, Modalities PRN    Patient/Family/Caregiver was advised of these findings, precautions, and treatment options and has agreed to actively participate in planning and for this course of care. Thank you for your referral. If you have any questions, please contact me at Dept: [phone]. Sincerely,  Electronically signed by therapist: Annel Krueger OT    Physician's certification required: Yes  Please co-sign or sign and return this letter via fax as soon as possible to [phone]. I certify the need for these services furnished under this plan of treatment and while under my care. X___________________________________________________ Date____________________    Certification From: 5/57/1216  To:5/16/2023       Date: 2/6/2023  Tx#: 5/12 Date: 2/8/2023  TX#: 6/12 Date: 2/13/2023  TX#: 7/12 Date: 2/15/2023  TX#: 8/12   -5 min hot pack to B hands for increased blood flow and prep for ROM  -RUE digit joint mobilization and PROM stretching of MCPs/PIPs/DIPs  -Yellow theraputty exercises for D2-D5 gripping & flexion, thumb flexion, and opposition strength.  -Round marbles 2pt pinch  for increase in pinch strength 2x15 marbles  -Yellow flexbar partial supination and pronation with therapist assist 1x5  -Yellow digiflex individual digits 1x5 -5 min hot pack to B hands for increased blood flow and prep for ROM  -RUE digit joint mobilization and PROM stretching of MCPs/PIPs/DIPs  - Yellow theraputty exercises for gripping with D2-D5, Thumb flexion, opposition strength, abduction strength exercise, digit extension exercise 1 set of 3 reps   - use of yellow clip and mancala board with flat marbles using a 3pt pinch to apply 2 marbles to a [place], 2pt pinch removal of marbles  - yellow digigrip full  1setx 10reps R hand, 1 set x 5 reps yellow digigrip individual digits. -5 min hot pack to B hands for increased blood flow and prep for ROM  -BUE digit joint mobilization and PROM stretching of MCPs/PIPs/DIPs  - pink sponges  with R and L hand using thermoplast intrinsic  bar  - Velcro checkerboard R hand removal and L hand replacement both with 3 pt pinch  - Mini connect 4 using 2 pinch B hands  - Yellow digiflex full  1x8 R hand   -extension glove R hand 1x5  -Review of HEP -5 min hot pack to bilateral hands for increased blood and prep for ROM  -PROM stretching for RUE digits with focus on digit extension  -Re-Assessment of Objective Measures  -Intrinsic  Bar Exercises with focus on yellow/pink sponge pick-up activity  -Yellow Flexbar Exercises for pronation, supination 2 sets of 5 reps each  -Perfection FMC and 2-Point Pinch strengthening exercise       HEP:   -Yellow Theraputty/Yellow Sponges Strengthening Exercises (1 set of 4 reps, 2-3x/day)    Charges: MTx1, TEx1, TAx1      Total Timed Treatment: 45 min  Total Treatment Time: 45 min

## 2023-02-20 ENCOUNTER — OFFICE VISIT (OUTPATIENT)
Dept: OCCUPATIONAL MEDICINE | Facility: HOSPITAL | Age: 83
End: 2023-02-20
Attending: Other
Payer: MEDICARE

## 2023-02-20 PROCEDURE — 97530 THERAPEUTIC ACTIVITIES: CPT

## 2023-02-20 PROCEDURE — 97110 THERAPEUTIC EXERCISES: CPT

## 2023-02-20 PROCEDURE — 97140 MANUAL THERAPY 1/> REGIONS: CPT

## 2023-02-20 NOTE — PROGRESS NOTES
Diagnosis: Acute CVA (cerebrovascular accident) ([facility] [address] (I63.9)      Referring Provider: Ronald Esqueda  Date of Evaluation:    1/18/2023    Precautions:  Poor Historian; Dementia; h/o CVA; Fall Risk Next MD visit:   none scheduled  Date of Surgery: n/a   Insurance Primary/Secondary: Deana Bailey         # Auth Visits: 9/12          Subjective: Patient presents to OT appt today accompanied by pt  Dinora Card who remained in lobby during treatment. Patient notes she does not feel as restricted to R hand anymore although it does get tired at times. Patient notes no pain today. Objective:     AROM (degrees): Pt BUE AROM WFL except noted below     Wrist   Flexion: R 65, L WFL  Extension: R 50, L WFL  Ulnar Deviation: R 15, L WFL  Radial Deviation R 5, L WFL      RIGHT HAND:     Thumb IF MF RF SF   MP 0-45 0-75 0-80 0-80 0-80   PIP IP=0-60 20-85 40-85 25-85 25-85   DIP   0-45 0-40 0-35 0-45   CHAUHAN 105 185 165 175 185      RUE Thumb   Palmar Abduction 0-45   Radial Abduction 0-45      MANUAL MUSCLE TESTING:   Shoulder Flexion: R=4-/5; L=4/5  Shoulder Abduction: R=4-/5; L=4/5  Shoulder External Rotation: R=4-/5; L=4/5  Shoulder Internal Rotation: R=4-/5; L=4/5  Elbow Flexion: R=4+/5; L=4+/5  Elbow Extension: R=4/5; L=4/5  Forearm Pronation:  R=4-/5; L=4-/5  Forearm Supination: R=4-/5; L=4-/5  Wrist Flexion: R=3+/5; L=4-/5  Wrist Extension: R=3+/5; L=4-/5     Strength (lbs) Right Average Left Average   : 10.0 lbs 13.0 lbs   2 pt Pinch: 1.0 lbs 4.0 lbs   3 pt Pinch: 2.0 lbs 4.0 lbs   Lateral Pinch: 5.0 lbs 9.0 lbs      SPECIAL TESTS:   Nine-Hole Peg Test: R=76.2 sec; L=36.9 sec  Rapid Alternating Movements (Pronation/Supination): RUE deficits noted  Finger-to-Nose Test: RUE deficits noted    Assessment: Patient has demonstrated steady progress with OT goals during plan of care at this time.  Patient noted with improvements in digit AROM for RUE although with some limitations to PIP/DIP joints noted more related to possible arthritic joints. However, patient noted with at least 40-50 degrees increased CHAUHAN for R D2-D5. Patient also noted with improvements in R  strength from 3.0 lbs to 10.0 lbs measured today alongside noted improvements in pinch strength measurements across the board as well. Patient was initially unable to  a single peg to complete Nine-Hole Peg Test for RUE at beginning of POC but currently was able to finish assessment with a time of 76.2 sec for RUE today. Patient is progressing well and would be appropriate to finish out established POC for OT at this time. Goals: (to be met in 12 visits)  1. Patient will demonstrate at least 200 degrees R D2-D5 CHAUHAN indicating increased ability to grasp and  objects for daily IADL management. 2. Patient will demonstrate at least 7.0 lbs R  strength indicating increased ability to manage functional ADL/IADLs. MET  3. Patient will demonstrate at least 60 degrees R wrist extension indicating increased ability to manage functiona tenodesis response for ADL/IADLs. 4. Patient will be educated and implement use of adaptive equipment which can increase functional independence with self feeding activities. MET  5. Patient will demonstrate ability to complete Nine-Hole Peg Test within 3 min indicating increased [facility] for ADL/IADL management. MET  6. Patient will be independent and compliant with comprehensive HEP to maintain progress achieved in OT. 7. Patient will demonstrate at least equal bilateral  strength indicating increased ability to manage functional ADL/IADLs. 8. Patient will demonstrate at least 60.0 sec or less for RUE Nine-Hole Peg Test completion indicating increased [facility] for IADL management. Plan: Patient will continue to be seen for 2x/week or a total of 12 visits over a 90 day period.   Treatment will include: Manual Therapy, Neuromuscular Re-education, Self-Care Home Management, Therapeutic Activities, Therapeutic Exercise and Home Exercise Program instruction, Splinting, Modalities PRN    Date: 2/8/2023  TX#: 6/12 Date: 2/13/2023  TX#: 7/12 Date: 2/15/2023  TX#: 8/12 Date: 2/20/2023  TX#: 9/12   -5 min hot pack to B hands for increased blood flow and prep for ROM  -RUE digit joint mobilization and PROM stretching of MCPs/PIPs/DIPs  - Yellow theraputty exercises for gripping with D2-D5, Thumb flexion, opposition strength, abduction strength exercise, digit extension exercise 1 set of 3 reps   - use of yellow clip and mancala board with flat marbles using a 3pt pinch to apply 2 marbles to a [place], 2pt pinch removal of marbles  - yellow digigrip full  1setx 10reps R hand, 1 set x 5 reps yellow digigrip individual digits. -5 min hot pack to B hands for increased blood flow and prep for ROM  -BUE digit joint mobilization and PROM stretching of MCPs/PIPs/DIPs  - pink sponges  with R and L hand using thermoplast intrinsic  bar  - Velcro checkerboard R hand removal and L hand replacement both with 3 pt pinch  - Mini connect 4 using 2 pinch B hands  - Yellow digiflex full  1x8 R hand   -extension glove R hand 1x5  -Review of HEP -5 min hot pack to bilateral hands for increased blood and prep for ROM  -PROM stretching for RUE digits with focus on digit extension  -Re-Assessment of Objective Measures  -Intrinsic  Bar Exercises with focus on yellow/pink sponge pick-up activity  -Yellow Flexbar Exercises for pronation, supination 2 sets of 5 reps each  -Perfection FMC and 2-Point Pinch strengthening exercise   -5 min hot pack to bilateral hands for increased blood and prep for ROM  -PROM stretching for RUE digits with focus on digit extension  -Perfection FMC/In-Hand Manipulation and coordination sorting activity  -Sudoku Board Wooden Tile Block FMC/In-Hand Manipulation and coordination sorting activity  -Red Digiflex Full  1 set of 10 reps each;  Yellow Digiflex Individual Digit 1 set of 10 reps each     HEP:   -Yellow Theraputty/Yellow Sponges Strengthening Exercises (1 set of 4 reps, 2-3x/day)    Charges: MTx1, TEx1, TAx1      Total Timed Treatment: 45 min  Total Treatment Time: 45 min

## 2023-02-22 ENCOUNTER — OFFICE VISIT (OUTPATIENT)
Dept: OCCUPATIONAL MEDICINE | Facility: HOSPITAL | Age: 83
End: 2023-02-22
Attending: Other
Payer: MEDICARE

## 2023-02-22 PROCEDURE — 97140 MANUAL THERAPY 1/> REGIONS: CPT

## 2023-02-22 PROCEDURE — 97110 THERAPEUTIC EXERCISES: CPT

## 2023-02-22 NOTE — PROGRESS NOTES
Diagnosis: Acute CVA (cerebrovascular accident) ([facility] [address] (I63.9)      Referring Provider: Gini Viera  Date of Evaluation:    1/18/2023    Precautions:  Poor Historian; Dementia; h/o CVA; Fall Risk Next MD visit:   none scheduled  Date of Surgery: n/a   Insurance Primary/Secondary: [employer] [facility]         # Auth Visits: 10/12          Subjective: Patient presents to OT appt today accompanied by pt  Delbert Aguirre who remained in lobby during treatment. Patient notes she has been able to use R hand more and completing more tasks at home. Patient notes no pain today. Objective:     AROM (degrees): Pt BUE AROM WFL except noted below     Wrist   Flexion: R 65, L WFL  Extension: R 50, L WFL  Ulnar Deviation: R 15, L WFL  Radial Deviation R 5, L WFL      RIGHT HAND:     Thumb IF MF RF SF   MP 0-45 0-75 0-80 0-80 0-80   PIP IP=0-60 20-85 40-85 25-85 25-85   DIP   0-45 0-40 0-35 0-45   CHAUHAN 105 185 165 175 185      RUE Thumb   Palmar Abduction 0-45   Radial Abduction 0-45      MANUAL MUSCLE TESTING:   Shoulder Flexion: R=4-/5; L=4/5  Shoulder Abduction: R=4-/5; L=4/5  Shoulder External Rotation: R=4-/5; L=4/5  Shoulder Internal Rotation: R=4-/5; L=4/5  Elbow Flexion: R=4+/5; L=4+/5  Elbow Extension: R=4/5; L=4/5  Forearm Pronation:  R=4-/5; L=4-/5  Forearm Supination: R=4-/5; L=4-/5  Wrist Flexion: R=3+/5; L=4-/5  Wrist Extension: R=3+/5; L=4-/5     Strength (lbs) Right Average Left Average   : 10.0 lbs 13.0 lbs   2 pt Pinch: 1.0 lbs 4.0 lbs   3 pt Pinch: 2.0 lbs 4.0 lbs   Lateral Pinch: 5.0 lbs 9.0 lbs      SPECIAL TESTS:   Nine-Hole Peg Test: R=76.2 sec; L=36.9 sec  Rapid Alternating Movements (Pronation/Supination): RUE deficits noted  Finger-to-Nose Test: RUE deficits noted    Assessment: Patient has demonstrated steady progress with OT goals during plan of care at this time. Patient noted with improvements in digit AROM for RUE although with some limitations to PIP/DIP joints noted more related to possible arthritic joints. However, patient noted with at least 40-50 degrees increased CHAUHAN for R D2-D5. Patient also noted with improvements in R  strength from 3.0 lbs to 10.0 lbs measured today alongside noted improvements in pinch strength measurements across the board as well. Patient was initially unable to  a single peg to complete Nine-Hole Peg Test for RUE at beginning of POC but currently was able to finish assessment with a time of 76.2 sec for RUE today. Patient is progressing well and would be appropriate to finish out established POC for OT at this time. Goals: (to be met in 12 visits)  1. Patient will demonstrate at least 200 degrees R D2-D5 CHAUHAN indicating increased ability to grasp and  objects for daily IADL management. 2. Patient will demonstrate at least 7.0 lbs R  strength indicating increased ability to manage functional ADL/IADLs. MET  3. Patient will demonstrate at least 60 degrees R wrist extension indicating increased ability to manage functiona tenodesis response for ADL/IADLs. 4. Patient will be educated and implement use of adaptive equipment which can increase functional independence with self feeding activities. MET  5. Patient will demonstrate ability to complete Nine-Hole Peg Test within 3 min indicating increased [facility] for ADL/IADL management. MET  6. Patient will be independent and compliant with comprehensive HEP to maintain progress achieved in OT. 7. Patient will demonstrate at least equal bilateral  strength indicating increased ability to manage functional ADL/IADLs. 8. Patient will demonstrate at least 60.0 sec or less for RUE Nine-Hole Peg Test completion indicating increased [facility] for IADL management. Plan: Patient will continue to be seen for 2x/week or a total of 12 visits over a 90 day period.   Treatment will include: Manual Therapy, Neuromuscular Re-education, Self-Care Home Management, Therapeutic Activities, Therapeutic Exercise and Home Exercise Program instruction, Splinting, Modalities PRN    Date: 2/13/2023  TX#: 7/12 Date: 2/15/2023  TX#: 8/12 Date: 2/20/2023  TX#: 9/12 Date: 2/22/2023  TX#: 10/12   -5 min hot pack to B hands for increased blood flow and prep for ROM  -BUE digit joint mobilization and PROM stretching of MCPs/PIPs/DIPs  - pink sponges  with R and L hand using thermoplast intrinsic  bar  - Velcro checkerboard R hand removal and L hand replacement both with 3 pt pinch  - Mini connect 4 using 2 pinch B hands  - Yellow digiflex full  1x8 R hand   -extension glove R hand 1x5  -Review of HEP -5 min hot pack to bilateral hands for increased blood and prep for ROM  -PROM stretching for RUE digits with focus on digit extension  -Re-Assessment of Objective Measures  -Intrinsic  Bar Exercises with focus on yellow/pink sponge pick-up activity  -Yellow Flexbar Exercises for pronation, supination 2 sets of 5 reps each  -Perfection FMC and 2-Point Pinch strengthening exercise   -5 min hot pack to bilateral hands for increased blood and prep for ROM  -PROM stretching for RUE digits with focus on digit extension  -Perfection FMC/In-Hand Manipulation and coordination sorting activity  -Sudoku Board Wooden Tile Block FMC/In-Hand Manipulation and coordination sorting activity  -Red Digiflex Full  1 set of 10 reps each; Yellow Digiflex Individual Digit 1 set of 10 reps each -5 min hot pack to bilateral hands for increased blood and prep for ROM  -PROM stretching for RUE digits with focus on digit extension  -2-Point Pinch [place] Activity  -Yellow Flexbar Exercises for pronation, supination 2 sets of 5 reps each  -Red Digiflex Full  2 sets of 10 reps each;  Yellow Digiflex Individual Digit 1 set of 10 reps each  -Digit Extension Glove 1 set of 10 reps each     HEP:   -Yellow Theraputty/Yellow Sponges Strengthening Exercises (1 set of 4 reps, 2-3x/day)    Charges: MTx1, TEx2     Total Timed Treatment: 45 min  Total Treatment Time: 45 min

## 2023-02-27 ENCOUNTER — OFFICE VISIT (OUTPATIENT)
Dept: OCCUPATIONAL MEDICINE | Facility: HOSPITAL | Age: 83
End: 2023-02-27
Attending: Other
Payer: MEDICARE

## 2023-02-27 ENCOUNTER — TELEPHONE (OUTPATIENT)
Dept: FAMILY MEDICINE CLINIC | Facility: CLINIC | Age: 83
End: 2023-02-27

## 2023-02-27 ENCOUNTER — OFFICE VISIT (OUTPATIENT)
Dept: FAMILY MEDICINE CLINIC | Facility: CLINIC | Age: 83
End: 2023-02-27
Payer: MEDICARE

## 2023-02-27 VITALS
HEIGHT: 66 IN | WEIGHT: 131 LBS | HEART RATE: 74 BPM | BODY MASS INDEX: 21.05 KG/M2 | SYSTOLIC BLOOD PRESSURE: 128 MMHG | DIASTOLIC BLOOD PRESSURE: 64 MMHG

## 2023-02-27 DIAGNOSIS — R42 DIZZINESS: Primary | ICD-10-CM

## 2023-02-27 LAB
ALBUMIN SERPL-MCNC: 3.6 G/DL (ref 3.4–5)
ALBUMIN/GLOB SERPL: 0.9 {RATIO} (ref 1–2)
ALP LIVER SERPL-CCNC: 66 U/L
ALT SERPL-CCNC: 16 U/L
ANION GAP SERPL CALC-SCNC: 2 MMOL/L (ref 0–18)
AST SERPL-CCNC: 19 U/L (ref 15–37)
BILIRUB SERPL-MCNC: 0.8 MG/DL (ref 0.1–2)
BUN BLD-MCNC: 12 MG/DL (ref 7–18)
CALCIUM BLD-MCNC: 9.3 MG/DL (ref 8.5–10.1)
CHLORIDE SERPL-SCNC: 111 MMOL/L (ref 98–112)
CO2 SERPL-SCNC: 25 MMOL/L (ref 21–32)
CREAT BLD-MCNC: 0.54 MG/DL
ERYTHROCYTE [DISTWIDTH] IN BLOOD BY AUTOMATED COUNT: 12.6 %
FASTING STATUS PATIENT QL REPORTED: NO
GFR SERPLBLD BASED ON 1.73 SQ M-ARVRAT: 91 ML/MIN/1.73M2 (ref 60–?)
GLOBULIN PLAS-MCNC: 3.9 G/DL (ref 2.8–4.4)
GLUCOSE BLD-MCNC: 138 MG/DL (ref 70–99)
HCT VFR BLD AUTO: 43.1 %
HGB BLD-MCNC: 14.8 G/DL
MCH RBC QN AUTO: 32.7 PG (ref 26–34)
MCHC RBC AUTO-ENTMCNC: 34.3 G/DL (ref 31–37)
MCV RBC AUTO: 95.1 FL
OSMOLALITY SERPL CALC.SUM OF ELEC: 288 MOSM/KG (ref 275–295)
PLATELET # BLD AUTO: 188 10(3)UL (ref 150–450)
POTASSIUM SERPL-SCNC: 3.6 MMOL/L (ref 3.5–5.1)
PROT SERPL-MCNC: 7.5 G/DL (ref 6.4–8.2)
RBC # BLD AUTO: 4.53 X10(6)UL
SODIUM SERPL-SCNC: 138 MMOL/L (ref 136–145)
TSI SER-ACNC: 1.88 MIU/ML (ref 0.36–3.74)
WBC # BLD AUTO: 9.2 X10(3) UL (ref 4–11)

## 2023-02-27 PROCEDURE — 84443 ASSAY THYROID STIM HORMONE: CPT | Performed by: FAMILY MEDICINE

## 2023-02-27 PROCEDURE — 3008F BODY MASS INDEX DOCD: CPT | Performed by: FAMILY MEDICINE

## 2023-02-27 PROCEDURE — 3078F DIAST BP <80 MM HG: CPT | Performed by: FAMILY MEDICINE

## 2023-02-27 PROCEDURE — 80053 COMPREHEN METABOLIC PANEL: CPT | Performed by: FAMILY MEDICINE

## 2023-02-27 PROCEDURE — 97140 MANUAL THERAPY 1/> REGIONS: CPT

## 2023-02-27 PROCEDURE — 36415 COLL VENOUS BLD VENIPUNCTURE: CPT | Performed by: FAMILY MEDICINE

## 2023-02-27 PROCEDURE — 85027 COMPLETE CBC AUTOMATED: CPT | Performed by: FAMILY MEDICINE

## 2023-02-27 PROCEDURE — 99214 OFFICE O/P EST MOD 30 MIN: CPT | Performed by: FAMILY MEDICINE

## 2023-02-27 PROCEDURE — 97110 THERAPEUTIC EXERCISES: CPT

## 2023-02-27 PROCEDURE — 3074F SYST BP LT 130 MM HG: CPT | Performed by: FAMILY MEDICINE

## 2023-02-27 NOTE — PROGRESS NOTES
Diagnosis: Acute CVA (cerebrovascular accident) ([facility] [address] (I63.9)      Referring Provider: Ross Bhardwaj  Date of Evaluation:    1/18/2023    Precautions:  Poor Historian; Dementia; h/o CVA; Fall Risk Next MD visit:   none scheduled  Date of Surgery: n/a   [facility]         # Auth Visits: 11/12          Subjective: Patient presents to OT appt today noting no significant changes since last visit. Patient notes R hand has been functional and she has been exercising at home. Objective:     AROM (degrees): Pt BUE AROM WFL except noted below     Wrist   Flexion: R 65, L WFL  Extension: R 50, L WFL  Ulnar Deviation: R 15, L WFL  Radial Deviation R 5, L WFL      RIGHT HAND:     Thumb IF MF RF SF   MP 0-45 0-75 0-80 0-80 0-80   PIP IP=0-60 20-85 40-85 25-85 25-85   DIP   0-45 0-40 0-35 0-45   CHAUHAN 105 185 165 175 185      RUE Thumb   Palmar Abduction 0-45   Radial Abduction 0-45      MANUAL MUSCLE TESTING:   Shoulder Flexion: R=4-/5; L=4/5  Shoulder Abduction: R=4-/5; L=4/5  Shoulder External Rotation: R=4-/5; L=4/5  Shoulder Internal Rotation: R=4-/5; L=4/5  Elbow Flexion: R=4+/5; L=4+/5  Elbow Extension: R=4/5; L=4/5  Forearm Pronation:  R=4-/5; L=4-/5  Forearm Supination: R=4-/5; L=4-/5  Wrist Flexion: R=3+/5; L=4-/5  Wrist Extension: R=3+/5; L=4-/5     Strength (lbs) Right Average Left Average   : 10.0 lbs 13.0 lbs   2 pt Pinch: 1.0 lbs 4.0 lbs   3 pt Pinch: 2.0 lbs 4.0 lbs   Lateral Pinch: 5.0 lbs 9.0 lbs      SPECIAL TESTS:   Nine-Hole Peg Test: R=76.2 sec; L=36.9 sec  Rapid Alternating Movements (Pronation/Supination): RUE deficits noted  Finger-to-Nose Test: RUE deficits noted    Assessment: Patient has demonstrated steady progress with OT goals during plan of care at this time. Patient noted with improvements in digit AROM for RUE although with some limitations to PIP/DIP joints noted more related to possible arthritic joints.  However, patient noted with at least 40-50 degrees increased CHAUHAN for R D2-D5. Patient also noted with improvements in R  strength from 3.0 lbs to 10.0 lbs measured today alongside noted improvements in pinch strength measurements across the board as well. Patient was initially unable to  a single peg to complete Nine-Hole Peg Test for RUE at beginning of POC but currently was able to finish assessment with a time of 76.2 sec for RUE today. Patient is progressing well and would be appropriate to finish out established POC for OT at this time. Goals: (to be met in 12 visits)  1. Patient will demonstrate at least 200 degrees R D2-D5 CHAUHAN indicating increased ability to grasp and  objects for daily IADL management. 2. Patient will demonstrate at least 7.0 lbs R  strength indicating increased ability to manage functional ADL/IADLs. MET  3. Patient will demonstrate at least 60 degrees R wrist extension indicating increased ability to manage functiona tenodesis response for ADL/IADLs. 4. Patient will be educated and implement use of adaptive equipment which can increase functional independence with self feeding activities. MET  5. Patient will demonstrate ability to complete Nine-Hole Peg Test within 3 min indicating increased [facility] for ADL/IADL management. MET  6. Patient will be independent and compliant with comprehensive HEP to maintain progress achieved in OT. 7. Patient will demonstrate at least equal bilateral  strength indicating increased ability to manage functional ADL/IADLs. 8. Patient will demonstrate at least 60.0 sec or less for RUE Nine-Hole Peg Test completion indicating increased [facility] for IADL management. Plan: Patient will continue to be seen for 2x/week or a total of 12 visits over a 90 day period.   Treatment will include: Manual Therapy, Neuromuscular Re-education, Self-Care Home Management, Therapeutic Activities, Therapeutic Exercise and Home Exercise Program instruction, Splinting, Modalities PRN    Date: 2/15/2023  TX#: 8/12 Date: 2/20/2023  TX#: 9/12 Date: 2/22/2023  TX#: 10/12 Date: 2/27/2023  TX#: 11/12   -5 min hot pack to bilateral hands for increased blood and prep for ROM  -PROM stretching for RUE digits with focus on digit extension  -Re-Assessment of Objective Measures  -Intrinsic  Bar Exercises with focus on yellow/pink sponge pick-up activity  -Yellow Flexbar Exercises for pronation, supination 2 sets of 5 reps each  -Perfection FMC and 2-Point Pinch strengthening exercise   -5 min hot pack to bilateral hands for increased blood and prep for ROM  -PROM stretching for RUE digits with focus on digit extension  -Perfection FMC/In-Hand Manipulation and coordination sorting activity  -Sudoku Board Wooden Tile Block FMC/In-Hand Manipulation and coordination sorting activity  -Red Digiflex Full  1 set of 10 reps each; Yellow Digiflex Individual Digit 1 set of 10 reps each -5 min hot pack to bilateral hands for increased blood and prep for ROM  -PROM stretching for RUE digits with focus on digit extension  -2-Point Pinch [place] Sorting [place] Activity  -Yellow Flexbar Exercises for pronation, supination 2 sets of 5 reps each  -Red Digiflex Full  2 sets of 10 reps each; Yellow Digiflex Individual Digit 1 set of 10 reps each  -Digit Extension Glove 1 set of 10 reps each -5 min hot pack to bilateral hands for increased blood and prep for ROM  -PROM stretching for RUE digits with focus on digit extension  -60 marble chinese checkers FMC/In-Hand Manipulation and coordination sorting activity  -Red Digiflex Full  2 sets of 10 reps each;  Yellow Digiflex Individual Digit 1 set of 10 reps each     HEP:   -Yellow Theraputty/Yellow Sponges Strengthening Exercises (1 set of 4 reps, 2-3x/day)    Charges: MTx1, TEx2     Total Timed Treatment: 45 min  Total Treatment Time: 45 min

## 2023-02-28 ENCOUNTER — TELEPHONE (OUTPATIENT)
Dept: FAMILY MEDICINE CLINIC | Facility: CLINIC | Age: 83
End: 2023-02-28

## 2023-02-28 NOTE — TELEPHONE ENCOUNTER
Please let , Tran Hogan, know that [address] labs look okay. He needs to encourage her to drink water more often during the day and eat regularly. She should eat in the morning, especially before she goes to therapy.   Small frequent meals are best.

## 2023-03-01 ENCOUNTER — OFFICE VISIT (OUTPATIENT)
Dept: OCCUPATIONAL MEDICINE | Facility: HOSPITAL | Age: 83
End: 2023-03-01
Attending: Other
Payer: MEDICARE

## 2023-03-01 PROCEDURE — 97110 THERAPEUTIC EXERCISES: CPT

## 2023-03-01 NOTE — PROGRESS NOTES
Discharge Summary  Diagnosis: Acute CVA (cerebrovascular accident) [facility]) (I63.9)      Referring Provider: Natalia Mirza  Date of Evaluation:    1/18/2023    Precautions:  Poor Historian; Dementia; h/o CVA; Fall Risk Next MD visit:   none scheduled  Date of Surgery: n/a   Insurance Primary/Secondary: [facility]         # Auth Visits: 12/12      Pt has attended 12 visits in Occupational Therapy. Subjective: Patient presents to OT appt today reporting feeling well today. Therapist asked patient how she was feeling today as therapist noted she had reported feeling dizzy the past few days warranting physician's visit for check-up. Patient notes she did eat breakfast and drank water this morning. Patient notes no pain today and that she currently is not feeling dizziness this morning. Assessment: Patient has demonstrated steady progress with OT goals during plan of care at this time. Patient noted with improvements in digit AROM for RUE although with some limitations to PIP/DIP joints noted more related to possible arthritic joints. However, patient noted with at least 40-50 degrees increased CHAUHAN for R D2-D5. Patient also noted with improvements in R  strength from 3.0 lbs to 11.0 lbs alongside noted improvements in pinch strength measurements across the board as well. Patient was initially unable to  a single peg to complete Nine-Hole Peg Test for RUE at beginning of POC but currently was able to finish assessment with a time of 76.2 sec for RUE today. Patient is limited d/t diagnosis of dementia and all HEP exercises and progress reviewed with patient  Miroslava Bosch. Patient would be appropriate at this time for discharge from formal OT and to continue with established HEP exercises.     Objective:     VITALS (at rest):  BP: 137/82  HR: 64 BPM  O2 Sat: 97%    AROM (degrees): Pt BUE AROM WFL except noted below     Wrist   Flexion: R 65, L WFL  Extension: R 50, L WFL  Ulnar Deviation: R 15, L WFL  Radial Deviation R 5, L WFL      RIGHT HAND:     Thumb IF MF RF SF   MP 0-50 0-75 0-80 0-80 0-80   PIP IP=0-70 20-85 40-85 25-85 25-85   DIP   0-40 0-50 0-45 0-45   CHAUHAN 105 185 165 175 185      RUE Thumb   Palmar Abduction 0-45   Radial Abduction 0-45      MANUAL MUSCLE TESTING:   Shoulder Flexion: R=4+/5; L=4+/5  Shoulder Abduction: R=4+/5; L=4+/5  Shoulder External Rotation: R=4/5; L=4+/5  Shoulder Internal Rotation: R=4+/5; L=4+/5  Elbow Flexion: R=5/5; L=5/5  Elbow Extension: R=4+/5; L=4+/5  Forearm Pronation:  R=4+/5; L=4+/5  Forearm Supination: R=4+/5; L=4+/5  Wrist Flexion: R=4+/5; L=4+/5  Wrist Extension: R=4+/5; L=4+/5     Strength (lbs) Right Average Left Average   : 11.0 lbs 20.0 lbs   2 pt Pinch: 1.0 lbs 4.0 lbs   3 pt Pinch: 2.0 lbs 4.0 lbs   Lateral Pinch: 5.0 lbs 9.0 lbs      SPECIAL TESTS:   Nine-Hole Peg Test: R=76.2 sec; L=35.5 sec    Goals: (to be met in 12 visits)  1. Patient will demonstrate at least 200 degrees R D2-D5 CHAUHAN indicating increased ability to grasp and  objects for daily IADL management. DISCONTINUE  2. Patient will demonstrate at least 7.0 lbs R  strength indicating increased ability to manage functional ADL/IADLs. MET  3. Patient will demonstrate at least 60 degrees R wrist extension indicating increased ability to manage functiona tenodesis response for ADL/IADLs. DISCONTINUE  4. Patient will be educated and implement use of adaptive equipment which can increase functional independence with self feeding activities. MET  5. Patient will demonstrate ability to complete Nine-Hole Peg Test within 3 min indicating increased [facility] for ADL/IADL management. MET  6. Patient will be independent and compliant with comprehensive HEP to maintain progress achieved in OT. MET  7. Patient will demonstrate at least equal bilateral  strength indicating increased ability to manage functional ADL/IADLs. DISCONTINUE  8.  Patient will demonstrate at least 60.0 sec or less for RUE Nine-Hole Peg Test completion indicating increased [facility] for IADL management. DISCONTINUE    Rehab Potential: good    Plan: Patient would be appropriate at this time for discharge from formal OT and to continue with established HEP exercises. Patient/Family/Caregiver was advised of these findings, precautions, and treatment options and has agreed to actively participate in planning and for this course of care. Thank you for your referral. If you have any questions, please contact me at Dept: [phone]. Sincerely,  Electronically signed by therapist: Lexy Mullen, OT        Certification From: 6/7/1125  To:5/30/2023        Date: 2/20/2023  TX#: 9/12 Date: 2/22/2023  TX#: 10/12 Date: 2/27/2023  TX#: 11/12 Date: 3/1/2023  TX#: 12/12   -5 min hot pack to bilateral hands for increased blood and prep for ROM  -PROM stretching for RUE digits with focus on digit extension  -Perfection C/In-Hand Manipulation and coordination sorting activity  -Sudoku Board Wooden Tile Block C/In-Hand Manipulation and coordination sorting activity  -Red Digiflex Full  1 set of 10 reps each; Yellow Digiflex Individual Digit 1 set of 10 reps each -5 min hot pack to bilateral hands for increased blood and prep for ROM  -PROM stretching for RUE digits with focus on digit extension  -2-Point Pinch [place] Sorting Flat [place] Activity  -Yellow Flexbar Exercises for pronation, supination 2 sets of 5 reps each  -Red Digiflex Full  2 sets of 10 reps each; Yellow Digiflex Individual Digit 1 set of 10 reps each  -Digit Extension Glove 1 set of 10 reps each -5 min hot pack to bilateral hands for increased blood and prep for ROM  -PROM stretching for RUE digits with focus on digit extension  -60 marble chinese checkers FMC/In-Hand Manipulation and coordination sorting activity  -Red Digiflex Full  2 sets of 10 reps each;  Yellow Digiflex Individual Digit 1 set of 10 reps each -5 min hot pack to bilateral hands for increased blood and prep for ROM  -PROM stretching for RUE digits with focus on digit extension  -Re-Assessment of Objective Measures  -Review of HEP Exercises and POC     HEP:   -Yellow Theraputty/Yellow Sponges Strengthening Exercises  -Tendon Gliding Exercises  -Wrist ROM Exercises  -[facility] Activities    Charges: TEx3     Total Timed Treatment: 40 min  Total Treatment Time: 40 min

## 2023-04-18 ENCOUNTER — HOSPITAL ENCOUNTER (INPATIENT)
Facility: HOSPITAL | Age: 83
LOS: 2 days | Discharge: HOME | End: 2023-04-20
Attending: EMERGENCY MEDICINE | Admitting: INTERNAL MEDICINE
Payer: MEDICARE

## 2023-04-18 ENCOUNTER — APPOINTMENT (OUTPATIENT)
Dept: CT IMAGING | Facility: HOSPITAL | Age: 83
End: 2023-04-18
Attending: EMERGENCY MEDICINE
Payer: MEDICARE

## 2023-04-18 ENCOUNTER — APPOINTMENT (OUTPATIENT)
Dept: CV DIAGNOSTICS | Facility: HOSPITAL | Age: 83
End: 2023-04-18
Attending: INTERNAL MEDICINE
Payer: MEDICARE

## 2023-04-18 DIAGNOSIS — I44.7 LEFT BUNDLE BRANCH BLOCK: ICD-10-CM

## 2023-04-18 DIAGNOSIS — R55 SYNCOPE AND COLLAPSE: Primary | ICD-10-CM

## 2023-04-18 LAB
ALBUMIN SERPL-MCNC: 3.3 G/DL (ref 3.4–5)
ALBUMIN/GLOB SERPL: 1 {RATIO} (ref 1–2)
ALP LIVER SERPL-CCNC: 56 U/L
ALT SERPL-CCNC: 15 U/L
ANION GAP SERPL CALC-SCNC: 2 MMOL/L (ref 0–18)
AST SERPL-CCNC: 14 U/L (ref 15–37)
ATRIAL RATE: 57 BPM
BASOPHILS # BLD AUTO: 0.03 X10(3) UL (ref 0–0.2)
BASOPHILS NFR BLD AUTO: 0.5 %
BILIRUB SERPL-MCNC: 0.9 MG/DL (ref 0.1–2)
BILIRUB UR QL STRIP.AUTO: NEGATIVE
BUN BLD-MCNC: 12 MG/DL (ref 7–18)
CALCIUM BLD-MCNC: 8.7 MG/DL (ref 8.5–10.1)
CHLORIDE SERPL-SCNC: 113 MMOL/L (ref 98–112)
CLARITY UR REFRACT.AUTO: CLEAR
CO2 SERPL-SCNC: 24 MMOL/L (ref 21–32)
COLOR UR AUTO: YELLOW
CREAT BLD-MCNC: 0.46 MG/DL
EOSINOPHIL # BLD AUTO: 0.14 X10(3) UL (ref 0–0.7)
EOSINOPHIL NFR BLD AUTO: 2.5 %
ERYTHROCYTE [DISTWIDTH] IN BLOOD BY AUTOMATED COUNT: 12.5 %
GFR SERPLBLD BASED ON 1.73 SQ M-ARVRAT: 94 ML/MIN/1.73M2 (ref 60–?)
GLOBULIN PLAS-MCNC: 3.4 G/DL (ref 2.8–4.4)
GLUCOSE BLD-MCNC: 96 MG/DL (ref 70–99)
GLUCOSE UR STRIP.AUTO-MCNC: NEGATIVE MG/DL
HCT VFR BLD AUTO: 41.5 %
HGB BLD-MCNC: 14.4 G/DL
IMM GRANULOCYTES # BLD AUTO: 0.01 X10(3) UL (ref 0–1)
IMM GRANULOCYTES NFR BLD: 0.2 %
KETONES UR STRIP.AUTO-MCNC: NEGATIVE MG/DL
LEUKOCYTE ESTERASE UR QL STRIP.AUTO: NEGATIVE
LYMPHOCYTES # BLD AUTO: 1.65 X10(3) UL (ref 1–4)
LYMPHOCYTES NFR BLD AUTO: 29.3 %
MCH RBC QN AUTO: 31.2 PG (ref 26–34)
MCHC RBC AUTO-ENTMCNC: 34.7 G/DL (ref 31–37)
MCV RBC AUTO: 90 FL
MONOCYTES # BLD AUTO: 0.43 X10(3) UL (ref 0.1–1)
MONOCYTES NFR BLD AUTO: 7.6 %
NEUTROPHILS # BLD AUTO: 3.37 X10 (3) UL (ref 1.5–7.7)
NEUTROPHILS # BLD AUTO: 3.37 X10(3) UL (ref 1.5–7.7)
NEUTROPHILS NFR BLD AUTO: 59.9 %
NITRITE UR QL STRIP.AUTO: NEGATIVE
OSMOLALITY SERPL CALC.SUM OF ELEC: 288 MOSM/KG (ref 275–295)
P AXIS: 41 DEGREES
P-R INTERVAL: 170 MS
PH UR STRIP.AUTO: 7 [PH] (ref 5–8)
PLATELET # BLD AUTO: 156 10(3)UL (ref 150–450)
POTASSIUM SERPL-SCNC: 3.8 MMOL/L (ref 3.5–5.1)
PROT SERPL-MCNC: 6.7 G/DL (ref 6.4–8.2)
PROT UR STRIP.AUTO-MCNC: NEGATIVE MG/DL
Q-T INTERVAL: 526 MS
QRS DURATION: 162 MS
QTC CALCULATION (BEZET): 511 MS
R AXIS: -13 DEGREES
RBC # BLD AUTO: 4.61 X10(6)UL
RBC UR QL AUTO: NEGATIVE
SODIUM SERPL-SCNC: 139 MMOL/L (ref 136–145)
SP GR UR STRIP.AUTO: 1.01 (ref 1–1.03)
T AXIS: 96 DEGREES
TROPONIN I HIGH SENSITIVITY: 36 NG/L
TROPONIN I HIGH SENSITIVITY: 46 NG/L
UROBILINOGEN UR STRIP.AUTO-MCNC: <2 MG/DL
VENTRICULAR RATE: 57 BPM
WBC # BLD AUTO: 5.6 X10(3) UL (ref 4–11)

## 2023-04-18 PROCEDURE — 70450 CT HEAD/BRAIN W/O DYE: CPT | Performed by: EMERGENCY MEDICINE

## 2023-04-18 PROCEDURE — 99223 1ST HOSP IP/OBS HIGH 75: CPT | Performed by: INTERNAL MEDICINE

## 2023-04-18 PROCEDURE — 93306 TTE W/DOPPLER COMPLETE: CPT | Performed by: INTERNAL MEDICINE

## 2023-04-18 PROCEDURE — 72125 CT NECK SPINE W/O DYE: CPT | Performed by: EMERGENCY MEDICINE

## 2023-04-18 RX ORDER — MEMANTINE HYDROCHLORIDE 5 MG/1
5 TABLET ORAL DAILY
Status: DISCONTINUED | OUTPATIENT
Start: 2023-04-18 | End: 2023-04-20

## 2023-04-18 RX ORDER — ATORVASTATIN CALCIUM 10 MG/1
10 TABLET, FILM COATED ORAL NIGHTLY
Status: DISCONTINUED | OUTPATIENT
Start: 2023-04-18 | End: 2023-04-20

## 2023-04-18 RX ORDER — POLYETHYLENE GLYCOL 3350 17 G/17G
17 POWDER, FOR SOLUTION ORAL DAILY PRN
Status: DISCONTINUED | OUTPATIENT
Start: 2023-04-18 | End: 2023-04-20

## 2023-04-18 RX ORDER — METOCLOPRAMIDE HYDROCHLORIDE 5 MG/ML
10 INJECTION INTRAMUSCULAR; INTRAVENOUS EVERY 8 HOURS PRN
Status: DISCONTINUED | OUTPATIENT
Start: 2023-04-18 | End: 2023-04-18

## 2023-04-18 RX ORDER — SENNOSIDES 8.6 MG
17.2 TABLET ORAL NIGHTLY PRN
Status: DISCONTINUED | OUTPATIENT
Start: 2023-04-18 | End: 2023-04-20

## 2023-04-18 RX ORDER — HALOPERIDOL 5 MG/ML
1 INJECTION INTRAMUSCULAR EVERY 6 HOURS PRN
Status: DISCONTINUED | OUTPATIENT
Start: 2023-04-18 | End: 2023-04-20

## 2023-04-18 RX ORDER — ACETAMINOPHEN 500 MG
500 TABLET ORAL EVERY 4 HOURS PRN
Status: DISCONTINUED | OUTPATIENT
Start: 2023-04-18 | End: 2023-04-20

## 2023-04-18 RX ORDER — ONDANSETRON 2 MG/ML
4 INJECTION INTRAMUSCULAR; INTRAVENOUS EVERY 6 HOURS PRN
Status: DISCONTINUED | OUTPATIENT
Start: 2023-04-18 | End: 2023-04-18

## 2023-04-18 RX ORDER — MELATONIN
3 NIGHTLY PRN
Status: DISCONTINUED | OUTPATIENT
Start: 2023-04-18 | End: 2023-04-20

## 2023-04-18 RX ORDER — SODIUM PHOSPHATE, DIBASIC AND SODIUM PHOSPHATE, MONOBASIC 7; 19 G/133ML; G/133ML
1 ENEMA RECTAL ONCE AS NEEDED
Status: DISCONTINUED | OUTPATIENT
Start: 2023-04-18 | End: 2023-04-20

## 2023-04-18 RX ORDER — BISACODYL 10 MG
10 SUPPOSITORY, RECTAL RECTAL
Status: DISCONTINUED | OUTPATIENT
Start: 2023-04-18 | End: 2023-04-20

## 2023-04-18 RX ORDER — AMLODIPINE BESYLATE 5 MG/1
5 TABLET ORAL DAILY
Status: DISCONTINUED | OUTPATIENT
Start: 2023-04-18 | End: 2023-04-20

## 2023-04-18 RX ORDER — ENOXAPARIN SODIUM 100 MG/ML
40 INJECTION SUBCUTANEOUS DAILY
Status: DISCONTINUED | OUTPATIENT
Start: 2023-04-18 | End: 2023-04-20

## 2023-04-18 NOTE — ED QUICK NOTES
Orders for admission, patient is aware of plan and ready to go upstairs. Any questions, please call ED RN Jagdeep White at extension [phone].      Patient Covid vaccination status: Fully vaccinated     COVID Test Ordered in ED: None    COVID Suspicion at Admission: N/A    Running Infusions:  None    Mental Status/LOC at time of transport: A/Ox1    Other pertinent information:   CIWA score: N/A   NIH score:  N/A

## 2023-04-18 NOTE — PLAN OF CARE
Pt has been very restless, irritable, confused and non-compliant with safety measures. Wandering in halls and trying to find a way to get out, getting combative when instructed to go back to her room. Short term memory severely impaired and unable to retain instructions or information. Order for posey vest obtained and applied. Frequent monitoring and reassessment.

## 2023-04-18 NOTE — ED QUICK NOTES
Rounding Completed    Plan of Care reviewed. Waiting for CT scan. Pure wick in place, patient in a position of comfort, has no further needs at this time. Bed is locked and in lowest position. Call light within reach.

## 2023-04-18 NOTE — PLAN OF CARE
At 3:30pm    NURSING ADMISSION NOTE      Patient admitted via [address] to room. Safety precautions initiated. Bed in low position. Call light in reach. Pt very confused with short term memory loss. Information obtained from son. Hospitalist paged and here to see pt.

## 2023-04-18 NOTE — CM/SW NOTE
Patient's  is caregiver. Patient with dementia and unable to be alone. Son is at bedside.  also seen in the ED and admitted to ICU. Patient will also require admission. Family aware that  is no longer able to care for patient. Resources provided:  AP, [facility] facilities, [address] [place] [address] facilities, respite rates. Patient will need to be followed inpatient.   SW and PT orders placed

## 2023-04-18 NOTE — ED INITIAL ASSESSMENT (HPI)
Patient to ED via EMS. Reports she was at the store with her  when he started to fall, she then fell on top of him. No real complaints, hx of dementia, doesn't really remember the entire incident.   [address] [place]

## 2023-04-18 NOTE — ED QUICK NOTES
Daughter at bedside, reports the patient is complaining of a headache.  + bump to top of head. The daughter reports she spoke with her father. Reports the patient was using her walker when walking into the store, thought the patient passed out.   Patient with a history of dementia, does not recall event

## 2023-04-19 LAB
ALBUMIN SERPL-MCNC: 3.5 G/DL (ref 3.4–5)
ALBUMIN/GLOB SERPL: 0.9 {RATIO} (ref 1–2)
ALP LIVER SERPL-CCNC: 66 U/L
ALT SERPL-CCNC: 15 U/L
ANION GAP SERPL CALC-SCNC: 3 MMOL/L (ref 0–18)
AST SERPL-CCNC: 13 U/L (ref 15–37)
BILIRUB SERPL-MCNC: 1.6 MG/DL (ref 0.1–2)
BUN BLD-MCNC: 10 MG/DL (ref 7–18)
CALCIUM BLD-MCNC: 9.4 MG/DL (ref 8.5–10.1)
CHLORIDE SERPL-SCNC: 111 MMOL/L (ref 98–112)
CO2 SERPL-SCNC: 24 MMOL/L (ref 21–32)
CREAT BLD-MCNC: 0.44 MG/DL
ERYTHROCYTE [DISTWIDTH] IN BLOOD BY AUTOMATED COUNT: 12.7 %
GFR SERPLBLD BASED ON 1.73 SQ M-ARVRAT: 95 ML/MIN/1.73M2 (ref 60–?)
GLOBULIN PLAS-MCNC: 3.9 G/DL (ref 2.8–4.4)
GLUCOSE BLD-MCNC: 98 MG/DL (ref 70–99)
HCT VFR BLD AUTO: 45 %
HGB BLD-MCNC: 15.6 G/DL
MAGNESIUM SERPL-MCNC: 2 MG/DL (ref 1.6–2.6)
MCH RBC QN AUTO: 31.6 PG (ref 26–34)
MCHC RBC AUTO-ENTMCNC: 34.7 G/DL (ref 31–37)
MCV RBC AUTO: 91.3 FL
OSMOLALITY SERPL CALC.SUM OF ELEC: 285 MOSM/KG (ref 275–295)
PHOSPHATE SERPL-MCNC: 3.2 MG/DL (ref 2.5–4.9)
PLATELET # BLD AUTO: 170 10(3)UL (ref 150–450)
POTASSIUM SERPL-SCNC: 3.5 MMOL/L (ref 3.5–5.1)
PROT SERPL-MCNC: 7.4 G/DL (ref 6.4–8.2)
RBC # BLD AUTO: 4.93 X10(6)UL
SODIUM SERPL-SCNC: 138 MMOL/L (ref 136–145)
WBC # BLD AUTO: 5.9 X10(3) UL (ref 4–11)

## 2023-04-19 PROCEDURE — 99231 SBSQ HOSP IP/OBS SF/LOW 25: CPT | Performed by: HOSPITALIST

## 2023-04-19 NOTE — PLAN OF CARE
Assumed care of patient around 1. Alert to self only. Hx dementia. Forgetful, pleasantly confused, impulsive. Posey vest applied on previous shift, removed around 2000 due to increased pt irritability. Care companion at bedside for pt safety. X1 prn iv haldol administered around 1953, pt appearing more calm and cooperative. Pt frequently removing telemetry monitor, NSR/sinus jacqui with bundle branch block. BP improving, 127/99. Continent of urine. Denies pain. Unsteady gait, able to ambulate short distances with gaitbelt and standby assist. Frequent rounding and fall precautions in place. PT eval ordered. Problem: SAFETY ADULT - FALL  Goal: Free from fall injury  Description: INTERVENTIONS:  - Assess pt frequently for physical needs  - Identify cognitive and physical deficits and behaviors that affect risk of falls.   - [place] fall precautions as indicated by assessment.  - Educate pt/family on patient safety including physical limitations  - Instruct pt to call for assistance with activity based on assessment  - Modify environment to reduce risk of injury  - Provide assistive devices as appropriate  - Consider OT/PT consult to assist with strengthening/mobility  - Encourage toileting schedule  Outcome: Progressing

## 2023-04-19 NOTE — DISCHARGE INSTRUCTIONS
Sometimes managing your health at home requires assistance. The [place] team has recognized your preference to use [facility]. They can be reached by phone at [phone]. The fax number for your reference is [phone]. . A representative from the home health agency will contact you or your family to schedule your first visit.

## 2023-04-19 NOTE — PLAN OF CARE
Assumed care of patient @ 0730. Patient is A&O x1 alert to self, disoriented to place, time, and situation. Impulsive at times, sitter at bedside. Patient redirectable. Up standby assist with walker. No complaints of pain. Patient is normal sinus rhythm on room air, no chest pain, no shortness of breath. Patient ambulating to bathroom, no complaints of dizziness. Patient is continent of bladder and bowel,  last bm prior to admission. fall precautions in place, call light and belongings within reach, bed in low position, patient updated on plan of care        Problem: Patient/Family Goals  Goal: Patient/Family Long Term Goal  Description: Patient's Long Term Goal: To go home     Interventions:  - PT to evaluate  - Monitor BP    - See additional Care Plan goals for specific interventions  Outcome: Not Progressing  Goal: Patient/Family Short Term Goal  Description: Patient's Short Term Goal: To go home     Interventions:   - Monitor BP   - See additional Care Plan goals for specific interventions  Outcome: Not Progressing     Problem: Safety Risk - Non-Violent Restraints  Goal: Patient will remain free from self-harm  Description: INTERVENTIONS:  - Apply the least restrictive restraint to prevent harm  - Notify patient and family of reasons restraints applied  - Assess for any contributing factors to confusion (electrolyte disturbances, delirium, medications)  - Discontinue any unnecessary medical devices as soon as possible  - Assess the patient's physical comfort, circulation, skin condition, hydration, nutrition and elimination needs   - Reorient and redirection as needed  - Assess for the need to continue restraints  Outcome: Progressing     Problem: SAFETY ADULT - FALL  Goal: Free from fall injury  Description: INTERVENTIONS:  - Assess pt frequently for physical needs  - Identify cognitive and physical deficits and behaviors that affect risk of falls.   - [place] fall precautions as indicated by assessment.  - Educate pt/family on patient safety including physical limitations  - Instruct pt to call for assistance with activity based on assessment  - Modify environment to reduce risk of injury  - Provide assistive devices as appropriate  - Consider OT/PT consult to assist with strengthening/mobility  - Encourage toileting schedule  Outcome: Not Progressing     Problem: Delirium  Goal: Minimize duration of delirium  Description: Interventions:  - Encourage use of hearing aids, eye glasses  - Promote highest level of mobility daily  - Provide frequent reorientation  - Promote wakefulness i.e. lights on, blinds open  - Promote sleep, encourage patient's normal rest cycle i.e. lights off, TV off, minimize noise and interruptions  - Encourage family to assist in orientation and promotion of home routines  Outcome: Not Progressing

## 2023-04-19 NOTE — CM/SW NOTE
04/19/23 1400   CM/SW Referral Data   Referral Source Social Work (self-referral)   Reason for Referral Discharge planning   Informant Daughter;EMR   Patient Info   Patient's Current Mental Status at Time of Assessment Memory Impairments   Patient's [address]   Patient lives with Spouse/Significant other   Discharge Needs   Anticipated D/C needs Home health care;Caregiver services     HOME SITUATION  [facility] One [facility];Elevator  Stairs to Bedroom: 0     Lives With: Spouse  Drives: No  Patient Owned Equipment: Rolling walker     Prior Level of [place]: Pt poor historian. Spoke with dtrJazz. Pt typically ambulates without device in the apt and utilizes RW when ambulating to elevator and when in community. Typically indep with dressing and bathing, performs sponge baths or uses shower chair. Poor memory so pt requires assistance with reminders and medication, which her  provides.  is primary caregiver, however he is currently in the ICU in the hospital as well. (Per PT evaluation)       Patient is an 79 y/o female who admitted with syncope and collapse and left bundle branch block, with history of dementia. PT recommending HH/24 hour care/supervision. Spoke with patent's daughter Rosaura Morillo) to discuss discharge planning. Patient lives at home with her  who is currently in the CNICU and daughter anticipating he might discharge home today. Discussed PT rec, she was agreeable with HH. Provided [facility] resources, she reached out and arranged a caregiver for discharge. Sent referrals in aidin. Awaiting accepting provider and patient choice. Addendum: [facility] able to accept patient and her . Reserved in aidin and updated AVS. SW will remain available.      YOAN Chávez  Discharge Planner  [phone]

## 2023-04-20 VITALS
DIASTOLIC BLOOD PRESSURE: 66 MMHG | WEIGHT: 128.31 LBS | BODY MASS INDEX: 20.62 KG/M2 | HEART RATE: 82 BPM | TEMPERATURE: 98 F | OXYGEN SATURATION: 91 % | HEIGHT: 66 IN | SYSTOLIC BLOOD PRESSURE: 141 MMHG | RESPIRATION RATE: 17 BRPM

## 2023-04-20 DIAGNOSIS — E78.00 PURE HYPERCHOLESTEROLEMIA: ICD-10-CM

## 2023-04-20 PROCEDURE — 99239 HOSP IP/OBS DSCHRG MGMT >30: CPT | Performed by: HOSPITALIST

## 2023-04-20 RX ORDER — AMLODIPINE BESYLATE 5 MG/1
5 TABLET ORAL DAILY
Qty: 90 TABLET | Refills: 0 | Status: SHIPPED | OUTPATIENT
Start: 2023-04-21

## 2023-04-20 NOTE — PLAN OF CARE
Pt discharged home. IV removed. Tele dc'd and returned to monitor tech. Follow up instructions provided and discussed. Pt and family verbilized understanding. Discussed adverse reactions and side effects of all new medicationsand provided appropriate handouts. Pt and family verbalized understanding. Pt wheeled down by staff with all belongings. Patient left denying complaints of pain or cardiac symptoms. All needs met by staff. Patient's daughter setting up caregiver to be home with , per daughter caregiver being set up. Patient discharged home taken home by patient's daughter.

## 2023-04-21 ENCOUNTER — PATIENT OUTREACH (OUTPATIENT)
Dept: CASE MANAGEMENT | Age: 83
End: 2023-04-21

## 2023-04-21 ENCOUNTER — TELEPHONE (OUTPATIENT)
Dept: FAMILY MEDICINE CLINIC | Facility: CLINIC | Age: 83
End: 2023-04-21

## 2023-04-21 RX ORDER — SIMVASTATIN 20 MG
TABLET ORAL
Qty: 90 TABLET | Refills: 0 | Status: SHIPPED | OUTPATIENT
Start: 2023-04-21

## 2023-04-21 NOTE — TELEPHONE ENCOUNTER
Attempted to contact pt for TCM today however no answer. Pt does not have HFU appt scheduled at this time. TCM/HFU appt recommended by 4/27/23 as pt is a high risk for readmission. Please advise. BOOK BY DATE (last date for TCM): 5/4/23    Clinical staff:  Please f/u with pt and try to get them to schedule as pt would greatly benefit from TCM/HFU appt. Thank you!

## 2023-04-21 NOTE — PROGRESS NOTES
Attempted to contact pt for TCM however no answer. Call rang a few times and then disconnected. Unable to leave a VM at this time. Will await a returned phone call. TE will be sent to PCP office to FU on HFU appt as pt is a high risk for readmission.

## 2023-06-05 RX ORDER — MEMANTINE HYDROCHLORIDE 7 MG/1
7 CAPSULE, EXTENDED RELEASE ORAL DAILY
Qty: 90 CAPSULE | Refills: 3 | Status: SHIPPED | OUTPATIENT
Start: 2023-06-05

## 2023-07-05 ENCOUNTER — TELEPHONE (OUTPATIENT)
Dept: FAMILY MEDICINE CLINIC | Facility: CLINIC | Age: 83
End: 2023-07-05

## 2023-07-05 NOTE — TELEPHONE ENCOUNTER
Called and talked to patient daughter and tried to get them in with another provider but she refused only wants to see Dr Jazmyn Kelley made an appointment for BP check on 7/17/23 for 15 min no other openings available

## 2023-07-05 NOTE — TELEPHONE ENCOUNTER
Pts daughter is calling because pt hasn't been taking her   amLODIPine 5 MG Oral Tab   And the daughter wants to know if this was discontinued of if the pt should be taking it R hip replacement

## 2023-07-05 NOTE — TELEPHONE ENCOUNTER
Left message on machine for patient's daughter to call back. Per review of ED notes, it appears patient should still be on amlodipine. Patient was hypertensive in ED. Should schedule follow up with this office.

## 2023-07-17 ENCOUNTER — OFFICE VISIT (OUTPATIENT)
Dept: FAMILY MEDICINE CLINIC | Facility: CLINIC | Age: 83
End: 2023-07-17
Payer: MEDICARE

## 2023-07-17 VITALS
RESPIRATION RATE: 12 BRPM | HEIGHT: 66 IN | SYSTOLIC BLOOD PRESSURE: 124 MMHG | WEIGHT: 135 LBS | DIASTOLIC BLOOD PRESSURE: 78 MMHG | HEART RATE: 84 BPM | BODY MASS INDEX: 21.69 KG/M2

## 2023-07-17 DIAGNOSIS — I10 PRIMARY HYPERTENSION: ICD-10-CM

## 2023-07-17 DIAGNOSIS — F03.C0 SEVERE DEMENTIA WITHOUT BEHAVIORAL DISTURBANCE, PSYCHOTIC DISTURBANCE, MOOD DISTURBANCE, OR ANXIETY, UNSPECIFIED DEMENTIA TYPE (HCC): Primary | ICD-10-CM

## 2023-07-17 PROCEDURE — 3008F BODY MASS INDEX DOCD: CPT | Performed by: FAMILY MEDICINE

## 2023-07-17 PROCEDURE — 3078F DIAST BP <80 MM HG: CPT | Performed by: FAMILY MEDICINE

## 2023-07-17 PROCEDURE — 99213 OFFICE O/P EST LOW 20 MIN: CPT | Performed by: FAMILY MEDICINE

## 2023-07-17 PROCEDURE — 1160F RVW MEDS BY RX/DR IN RCRD: CPT | Performed by: FAMILY MEDICINE

## 2023-07-17 PROCEDURE — 1159F MED LIST DOCD IN RCRD: CPT | Performed by: FAMILY MEDICINE

## 2023-07-17 PROCEDURE — 3074F SYST BP LT 130 MM HG: CPT | Performed by: FAMILY MEDICINE

## 2023-07-19 DIAGNOSIS — E78.00 PURE HYPERCHOLESTEROLEMIA: ICD-10-CM

## 2023-07-20 RX ORDER — SIMVASTATIN 20 MG
TABLET ORAL
Qty: 90 TABLET | Refills: 1 | Status: SHIPPED | OUTPATIENT
Start: 2023-07-20

## 2023-07-20 NOTE — TELEPHONE ENCOUNTER
Requested Prescriptions     Pending Prescriptions Disp Refills    SIMVASTATIN 20 MG Oral Tab [Pharmacy Med Name: SIMVASTATIN TABS 20MG] 90 tablet 3     Sig: TAKE 1 TABLET NIGHTLY     LOV 7/17/2023     Patient was asked to follow-up in: Follow-up not documented in note    Appointment scheduled: Visit date not found     Medication refilled per protocol.

## 2023-07-21 ENCOUNTER — TELEPHONE (OUTPATIENT)
Dept: FAMILY MEDICINE CLINIC | Facility: CLINIC | Age: 83
End: 2023-07-21

## 2023-07-21 NOTE — TELEPHONE ENCOUNTER
[facility] [place] [id number] for a UV phototherapy wand.   Patient's  states they did not order and will discard this request.

## 2023-10-19 RX ORDER — MEMANTINE HYDROCHLORIDE 7 MG/1
7 CAPSULE, EXTENDED RELEASE ORAL DAILY
Qty: 90 CAPSULE | Refills: 3 | Status: SHIPPED | OUTPATIENT
Start: 2023-10-19

## 2023-12-11 ENCOUNTER — APPOINTMENT (OUTPATIENT)
Dept: GENERAL RADIOLOGY | Facility: HOSPITAL | Age: 83
End: 2023-12-11
Attending: EMERGENCY MEDICINE
Payer: MEDICARE

## 2023-12-11 ENCOUNTER — HOSPITAL ENCOUNTER (EMERGENCY)
Facility: HOSPITAL | Age: 83
Discharge: HOME | End: 2023-12-11
Attending: EMERGENCY MEDICINE
Payer: MEDICARE

## 2023-12-11 VITALS
TEMPERATURE: 99 F | RESPIRATION RATE: 18 BRPM | BODY MASS INDEX: 19.29 KG/M2 | HEIGHT: 66 IN | HEART RATE: 66 BPM | WEIGHT: 120 LBS | OXYGEN SATURATION: 96 % | DIASTOLIC BLOOD PRESSURE: 79 MMHG | SYSTOLIC BLOOD PRESSURE: 167 MMHG

## 2023-12-11 DIAGNOSIS — M25.511 ACUTE PAIN OF RIGHT SHOULDER: Primary | ICD-10-CM

## 2023-12-11 PROCEDURE — 72052 X-RAY EXAM NECK SPINE 6/>VWS: CPT | Performed by: EMERGENCY MEDICINE

## 2023-12-11 PROCEDURE — 96372 THER/PROPH/DIAG INJ SC/IM: CPT

## 2023-12-11 PROCEDURE — 99283 EMERGENCY DEPT VISIT LOW MDM: CPT

## 2023-12-11 PROCEDURE — 99284 EMERGENCY DEPT VISIT MOD MDM: CPT

## 2023-12-11 PROCEDURE — 73030 X-RAY EXAM OF SHOULDER: CPT | Performed by: EMERGENCY MEDICINE

## 2023-12-11 RX ORDER — TRAMADOL HYDROCHLORIDE 50 MG/1
TABLET ORAL EVERY 6 HOURS PRN
Qty: 10 TABLET | Refills: 0 | Status: SHIPPED | OUTPATIENT
Start: 2023-12-11 | End: 2023-12-16

## 2023-12-11 RX ORDER — TRAMADOL HYDROCHLORIDE 50 MG/1
50 TABLET ORAL ONCE
Status: COMPLETED | OUTPATIENT
Start: 2023-12-11 | End: 2023-12-11

## 2023-12-11 RX ORDER — KETOROLAC TROMETHAMINE 30 MG/ML
30 INJECTION, SOLUTION INTRAMUSCULAR; INTRAVENOUS ONCE
Status: COMPLETED | OUTPATIENT
Start: 2023-12-11 | End: 2023-12-11

## 2023-12-11 RX ORDER — NAPROXEN 500 MG/1
500 TABLET ORAL 2 TIMES DAILY PRN
Qty: 20 TABLET | Refills: 0 | Status: SHIPPED | OUTPATIENT
Start: 2023-12-11

## 2023-12-11 NOTE — ED INITIAL ASSESSMENT (HPI)
To the ED with c/o R sided neck pain that radiates down her R shoulder. Ongoing x 1 week but worse this AM. Worse with movement. Denies any pain to back or L side. No injury or new activity. Is able to move the R arm.

## 2023-12-11 NOTE — ED QUICK NOTES
Pt continues to come up to TL and I keep repeating myself. Pt has a hx of dementia. Family with her appears to be no help.

## 2023-12-11 NOTE — ED QUICK NOTES
Pt is very disgruntled with me since I am not doing anything for her. When asked what I could do she says \" I can't just sit here. \" I explained lack of open rooms and multiple patients awaiting a room. \"Well, you're going to do nothing if I pass out? \" She was reassured that I would not lack action if she passed out. Pt sitting back down. Pt back to  requesting somewhere to lay down. Pt informed that I only have [address] [facility] chairs.

## 2024-01-15 DIAGNOSIS — E78.00 PURE HYPERCHOLESTEROLEMIA: ICD-10-CM

## 2024-01-16 RX ORDER — SIMVASTATIN 20 MG
TABLET ORAL
Qty: 90 TABLET | Refills: 1 | Status: SHIPPED | OUTPATIENT
Start: 2024-01-16

## 2024-01-16 NOTE — TELEPHONE ENCOUNTER
Requested Prescriptions     Pending Prescriptions Disp Refills    SIMVASTATIN 20 MG Oral Tab [Pharmacy Med Name: SIMVASTATIN TABS 20MG] 90 tablet 3     Sig: TAKE 1 TABLET NIGHTLY     LOV 7/17/2023     Patient was asked to follow-up in: Follow-up not documented in note    Appointment scheduled: Visit date not found     Medication failed protocol due to:   Lipid panel within past 12 months       Routed to Santa Shah MD, for review.

## 2024-05-20 ENCOUNTER — TELEPHONE (OUTPATIENT)
Dept: FAMILY MEDICINE CLINIC | Facility: CLINIC | Age: 84
End: 2024-05-20

## 2024-05-21 NOTE — TELEPHONE ENCOUNTER
Received wellness forms from [employer] they would like completed at appointment that is scheduled with Dr. Castillo 05/24/24. Forms placed in Dr. Castillo's in box awaiting appointment

## 2024-05-24 ENCOUNTER — OFFICE VISIT (OUTPATIENT)
Dept: FAMILY MEDICINE CLINIC | Facility: CLINIC | Age: 84
End: 2024-05-24

## 2024-05-24 VITALS
HEART RATE: 70 BPM | SYSTOLIC BLOOD PRESSURE: 130 MMHG | BODY MASS INDEX: 21 KG/M2 | OXYGEN SATURATION: 97 % | RESPIRATION RATE: 16 BRPM | WEIGHT: 129 LBS | DIASTOLIC BLOOD PRESSURE: 62 MMHG | TEMPERATURE: 97 F

## 2024-05-24 DIAGNOSIS — Z02.89 ENCOUNTER FOR COMPLETION OF FORM WITH PATIENT: Primary | ICD-10-CM

## 2024-05-24 DIAGNOSIS — Z11.1 SCREENING FOR TUBERCULOSIS: ICD-10-CM

## 2024-05-24 DIAGNOSIS — Z78.9 POLST (PHYSICIAN ORDERS FOR LIFE-SUSTAINING TREATMENT): ICD-10-CM

## 2024-05-24 PROBLEM — R55 SYNCOPE AND COLLAPSE: Status: RESOLVED | Noted: 2023-04-18 | Resolved: 2024-05-24

## 2024-05-24 PROCEDURE — 99215 OFFICE O/P EST HI 40 MIN: CPT | Performed by: STUDENT IN AN ORGANIZED HEALTH CARE EDUCATION/TRAINING PROGRAM

## 2024-05-29 NOTE — PROGRESS NOTES
[facility]    Chief complaint: Forms completion     HPI: Noemi Bae is 83 year old female here for exam and completion of paperwork to move into the [facility] with her .     Current Outpatient Medications on File Prior to Visit   Medication Sig Dispense Refill    simvastatin 20 MG Oral Tab TAKE 1 TABLET NIGHTLY 90 tablet 1    naproxen 500 MG Oral Tab Take 1 tablet (500 mg total) by mouth 2 (two) times daily as needed. 20 tablet 0    memantine ER 7 MG Oral Capsule SR 24 Hr Take 1 capsule (7 mg total) by mouth daily. 90 capsule 3    Ginseng 100 MG Oral Cap Take 1 Cap by mouth daily.      Melatonin 10 MG Oral Tab Take 1 Tab by mouth every evening.       No current facility-administered medications on file prior to visit.     Patient Active Problem List   Diagnosis    Pure hypercholesterolemia    MVP (mitral valve prolapse)    Memory deficit    Hx of colon cancer, stage unknown    Hyperglycemia    Severe dementia without behavioral disturbance, psychotic disturbance, mood disturbance, or anxiety (HCC)    Primary hypertension    Hx of completed stroke    Left bundle branch block       Past Medical History:    Dementia (HCC)    Malignant neoplasm of ascending colon (HCC)    Other and unspecified hyperlipidemia    Stroke (HCC)          SH: reviewed     FH: reviewed     Social History     Social History Narrative    Not on file        ROS: full 10 point review of systems done and otherwise negative.      Healthcare Maintenance:  Health Maintenance   Topic Date Due    Pneumococcal Vaccine: 65+ Years (1 of 2 - PCV) Never done    Zoster Vaccines (1 of 2) Never done    Colorectal Cancer Screening  10/03/2016    COVID-19 Vaccine (4 - 2023-24 season) 09/01/2023    MA Annual Health Assessment  01/01/2024    Annual Depression Screening  01/01/2024    Fall Risk Screening (Annual)  01/01/2024    Influenza Vaccine (Season Ended) 10/01/2024    DEXA Scan  Completed         PE:  Vital Signs    05/24/24 0858   BP: 130/62   Pulse: 70   Resp: 16   Temp: 97.2 °F (36.2 °C)     Wt Readings from Last 3 Encounters:   05/24/24 129 lb (58.5 kg)   12/11/23 120 lb (54.4 kg)   07/17/23 135 lb (61.2 kg)     Body mass index is 20.82 kg/m².     General: Comfortable, pleasant, NAD, appears stated age  HEENT.  NC/AT  CARD: regular rate  Pulm. Comfortable WOB  Abdomen. nondistended  Extremities.  no edema  Skin.  No concerning moles      No visits with results within 4 Week(s) from this visit.   Latest known visit with results is:   Admission on 04/18/2023, Discharged on 04/20/2023   Component Date Value Ref Range Status    Glucose 04/18/2023 96  70 - 99 mg/dL Final    Sodium 04/18/2023 139  136 - 145 mmol/L Final    Potassium 04/18/2023 3.8  3.5 - 5.1 mmol/L Final    Chloride 04/18/2023 113 (H)  98 - 112 mmol/L Final    CO2 04/18/2023 24.0  21.0 - 32.0 mmol/L Final    Anion Gap 04/18/2023 2  0 - 18 mmol/L Final    BUN 04/18/2023 12  7 - 18 mg/dL Final    Creatinine 04/18/2023 0.46 (L)  0.55 - 1.02 mg/dL Final    Calcium, Total 04/18/2023 8.7  8.5 - 10.1 mg/dL Final    Calculated Osmolality 04/18/2023 288  275 - 295 mOsm/kg Final    eGFR-Cr 04/18/2023 94  >=60 mL/min/1.73m2 Final    AST 04/18/2023 14 (L)  15 - 37 U/L Final    ALT 04/18/2023 15  13 - 56 U/L Final    Alkaline Phosphatase 04/18/2023 56  55 - 142 U/L Final    Bilirubin, Total 04/18/2023 0.9  0.1 - 2.0 mg/dL Final    Total Protein 04/18/2023 6.7  6.4 - 8.2 g/dL Final    Albumin 04/18/2023 3.3 (L)  3.4 - 5.0 g/dL Final    Globulin  04/18/2023 3.4  2.8 - 4.4 g/dL Final    A/G Ratio 04/18/2023 1.0  1.0 - 2.0 Final    Ventricular rate 04/18/2023 57  BPM Final    Atrial rate 04/18/2023 57  BPM Final    P-R Interval 04/18/2023 170  ms Final    QRS Duration 04/18/2023 162  ms Final    Q-T Interval 04/18/2023 526  ms Final    QTC Calculation (Bezet) 04/18/2023 511  ms Final    P Axis 04/18/2023 41  degrees Final    R Axis 04/18/2023 -13  degrees  Final    T Sabael 04/18/2023 96  degrees Final    Hold Lavender 04/18/2023 Auto Resulted   Final    Hold Lt Green 04/18/2023 Auto Resulted   Final    Hold Blue 04/18/2023 Auto Resulted   Final    WBC 04/18/2023 5.6  4.0 - 11.0 x10(3) uL Final    RBC 04/18/2023 4.61  3.80 - 5.30 x10(6)uL Final    HGB 04/18/2023 14.4  12.0 - 16.0 g/dL Final    HCT 04/18/2023 41.5  35.0 - 48.0 % Final    PLT 04/18/2023 156.0  150.0 - 450.0 10(3)uL Final    MCV 04/18/2023 90.0  80.0 - 100.0 fL Final    MCH 04/18/2023 31.2  26.0 - 34.0 pg Final    MCHC 04/18/2023 34.7  31.0 - 37.0 g/dL Final    RDW 04/18/2023 12.5  % Final    Neutrophil Absolute Prelim 04/18/2023 3.37  1.50 - 7.70 x10 (3) uL Final    Neutrophil Absolute 04/18/2023 3.37  1.50 - 7.70 x10(3) uL Final    Lymphocyte Absolute 04/18/2023 1.65  1.00 - 4.00 x10(3) uL Final    Monocyte Absolute 04/18/2023 0.43  0.10 - 1.00 x10(3) uL Final    Eosinophil Absolute 04/18/2023 0.14  0.00 - 0.70 x10(3) uL Final    Basophil Absolute 04/18/2023 0.03  0.00 - 0.20 x10(3) uL Final    Immature Granulocyte Absolute 04/18/2023 0.01  0.00 - 1.00 x10(3) uL Final    Neutrophil % 04/18/2023 59.9  % Final    Lymphocyte % 04/18/2023 29.3  % Final    Monocyte % 04/18/2023 7.6  % Final    Eosinophil % 04/18/2023 2.5  % Final    Basophil % 04/18/2023 0.5  % Final    Immature Granulocyte % 04/18/2023 0.2  % Final    Troponin I (High Sensitivity) 04/18/2023 36  <=54 ng/L Final    Urine Color 04/18/2023 Yellow  Yellow Final    Clarity Urine 04/18/2023 Clear  Clear Final    Spec Gravity 04/18/2023 1.009  1.001 - 1.030 Final    Glucose Urine 04/18/2023 Negative  Negative mg/dL Final    Bilirubin Urine 04/18/2023 Negative  Negative Final    Ketones Urine 04/18/2023 Negative  Negative mg/dL Final    Blood Urine 04/18/2023 Negative  Negative Final    pH Urine 04/18/2023 7.0  5.0 - 8.0 Final    Protein Urine 04/18/2023 Negative  Negative mg/dL Final    Urobilinogen Urine 04/18/2023 <2.0  <2.0 mg/dL Final     Nitrite Urine 04/18/2023 Negative  Negative Final    Leukocyte Esterase Urine 04/18/2023 Negative  Negative Final    Microscopic 04/18/2023 Microscopic not indicated   Final    Troponin I (High Sensitivity) 04/18/2023 46  <=54 ng/L Final    Glucose 04/19/2023 98  70 - 99 mg/dL Final    Sodium 04/19/2023 138  136 - 145 mmol/L Final    Potassium 04/19/2023 3.5  3.5 - 5.1 mmol/L Final    Chloride 04/19/2023 111  98 - 112 mmol/L Final    CO2 04/19/2023 24.0  21.0 - 32.0 mmol/L Final    Anion Gap 04/19/2023 3  0 - 18 mmol/L Final    BUN 04/19/2023 10  7 - 18 mg/dL Final    Creatinine 04/19/2023 0.44 (L)  0.55 - 1.02 mg/dL Final    Calcium, Total 04/19/2023 9.4  8.5 - 10.1 mg/dL Final    Calculated Osmolality 04/19/2023 285  275 - 295 mOsm/kg Final    eGFR-Cr 04/19/2023 95  >=60 mL/min/1.73m2 Final    AST 04/19/2023 13 (L)  15 - 37 U/L Final    ALT 04/19/2023 15  13 - 56 U/L Final    Alkaline Phosphatase 04/19/2023 66  55 - 142 U/L Final    Bilirubin, Total 04/19/2023 1.6  0.1 - 2.0 mg/dL Final    Total Protein 04/19/2023 7.4  6.4 - 8.2 g/dL Final    Albumin 04/19/2023 3.5  3.4 - 5.0 g/dL Final    Globulin  04/19/2023 3.9  2.8 - 4.4 g/dL Final    A/G Ratio 04/19/2023 0.9 (L)  1.0 - 2.0 Final    Magnesium 04/19/2023 2.0  1.6 - 2.6 mg/dL Final    Phosphorus 04/19/2023 3.2  2.5 - 4.9 mg/dL Final    WBC 04/19/2023 5.9  4.0 - 11.0 x10(3) uL Final    RBC 04/19/2023 4.93  3.80 - 5.30 x10(6)uL Final    HGB 04/19/2023 15.6  12.0 - 16.0 g/dL Final    HCT 04/19/2023 45.0  35.0 - 48.0 % Final    PLT 04/19/2023 170.0  150.0 - 450.0 10(3)uL Final    MCV 04/19/2023 91.3  80.0 - 100.0 fL Final    MCH 04/19/2023 31.6  26.0 - 34.0 pg Final    MCHC 04/19/2023 34.7  31.0 - 37.0 g/dL Final    RDW 04/19/2023 12.7  % Final        A/P: Noemi Bae is 83 year old yo female here for exam and forms completion.    Med list, problem list reviewed. POLST form completed. Patient discusses with her  (POA) and son in the room and indicates she  would like to remain full code.       TB risk assessment completed     ADL's assessed and required services indicated      Paperwork for [facility] completed and will be faxed and scanned.     Outpatient Encounter Medications as of 5/24/2024   Medication Sig Dispense Refill    simvastatin 20 MG Oral Tab TAKE 1 TABLET NIGHTLY 90 tablet 1    naproxen 500 MG Oral Tab Take 1 tablet (500 mg total) by mouth 2 (two) times daily as needed. 20 tablet 0    memantine ER 7 MG Oral Capsule SR 24 Hr Take 1 capsule (7 mg total) by mouth daily. 90 capsule 3    Ginseng 100 MG Oral Cap Take 1 Cap by mouth daily.      Melatonin 10 MG Oral Tab Take 1 Tab by mouth every evening.       No facility-administered encounter medications on file as of 5/24/2024.           Side effects, risks and benefits of medications were explained.  The patient or responsible adult showed the ability to learn, asked appropriate questions.  There were no barriers to learning and they verbalized understanding of the treatment plan.     Medication list provided to patient and /or family member.     This note was created in part by Dragon recognition software.  Please excuse errors.     I spent a total of 40 minutes in both face-to-face and non-face-to-face activities for this visit on the date of this encounter.

## 2024-05-29 NOTE — TELEPHONE ENCOUNTER
Faxed paperwork to Apple Washburn at [facility] at [phone].  Paperwork copied and sent to scan.

## 2024-06-03 ENCOUNTER — LAB ENCOUNTER (OUTPATIENT)
Dept: LAB | Age: 84
End: 2024-06-03
Attending: STUDENT IN AN ORGANIZED HEALTH CARE EDUCATION/TRAINING PROGRAM
Payer: MEDICARE

## 2024-06-03 DIAGNOSIS — Z11.1 SCREENING FOR TUBERCULOSIS: ICD-10-CM

## 2024-06-03 PROCEDURE — 86480 TB TEST CELL IMMUN MEASURE: CPT

## 2024-06-03 PROCEDURE — 36415 COLL VENOUS BLD VENIPUNCTURE: CPT

## 2024-06-06 ENCOUNTER — TELEPHONE (OUTPATIENT)
Dept: FAMILY MEDICINE CLINIC | Facility: CLINIC | Age: 84
End: 2024-06-06

## 2024-06-06 LAB
M TB IFN-G CD4+ T-CELLS BLD-ACNC: 0.07 IU/ML
M TB TUBERC IFN-G BLD QL: NEGATIVE
M TB TUBERC IGNF/MITOGEN IGNF CONTROL: >10 IU/ML
QFT TB1 AG MINUS NIL: -0.01 IU/ML
QFT TB2 AG MINUS NIL: -0.01 IU/ML

## 2024-06-06 NOTE — TELEPHONE ENCOUNTER
Daughter is calling she needs the TB results sent to [place] Attn: Apple at [phone] after resulted by Dr. Castillo. She was just notified the results were in.

## 2024-06-09 ENCOUNTER — HOSPITAL ENCOUNTER (EMERGENCY)
Facility: HOSPITAL | Age: 84
Discharge: HOME | End: 2024-06-10
Attending: EMERGENCY MEDICINE
Payer: MEDICARE

## 2024-06-09 ENCOUNTER — APPOINTMENT (OUTPATIENT)
Dept: CT IMAGING | Facility: HOSPITAL | Age: 84
End: 2024-06-09
Attending: EMERGENCY MEDICINE
Payer: MEDICARE

## 2024-06-09 DIAGNOSIS — G44.209 TENSION HEADACHE: Primary | ICD-10-CM

## 2024-06-09 LAB
BASOPHILS # BLD AUTO: 0.05 X10(3) UL (ref 0–0.2)
BASOPHILS NFR BLD AUTO: 0.6 %
EOSINOPHIL # BLD AUTO: 0.15 X10(3) UL (ref 0–0.7)
EOSINOPHIL NFR BLD AUTO: 1.7 %
ERYTHROCYTE [DISTWIDTH] IN BLOOD BY AUTOMATED COUNT: 12.9 %
HCT VFR BLD AUTO: 40.3 %
HGB BLD-MCNC: 14.4 G/DL
IMM GRANULOCYTES # BLD AUTO: 0.02 X10(3) UL (ref 0–1)
IMM GRANULOCYTES NFR BLD: 0.2 %
LYMPHOCYTES # BLD AUTO: 2.25 X10(3) UL (ref 1–4)
LYMPHOCYTES NFR BLD AUTO: 26.2 %
MCH RBC QN AUTO: 32.7 PG (ref 26–34)
MCHC RBC AUTO-ENTMCNC: 35.7 G/DL (ref 31–37)
MCV RBC AUTO: 91.4 FL
MONOCYTES # BLD AUTO: 0.78 X10(3) UL (ref 0.1–1)
MONOCYTES NFR BLD AUTO: 9.1 %
NEUTROPHILS # BLD AUTO: 5.34 X10 (3) UL (ref 1.5–7.7)
NEUTROPHILS # BLD AUTO: 5.34 X10(3) UL (ref 1.5–7.7)
NEUTROPHILS NFR BLD AUTO: 62.2 %
PLATELET # BLD AUTO: 160 10(3)UL (ref 150–450)
RBC # BLD AUTO: 4.41 X10(6)UL
WBC # BLD AUTO: 8.6 X10(3) UL (ref 4–11)

## 2024-06-09 PROCEDURE — 99284 EMERGENCY DEPT VISIT MOD MDM: CPT

## 2024-06-09 PROCEDURE — 85025 COMPLETE CBC W/AUTO DIFF WBC: CPT | Performed by: EMERGENCY MEDICINE

## 2024-06-09 PROCEDURE — 36415 COLL VENOUS BLD VENIPUNCTURE: CPT

## 2024-06-09 PROCEDURE — 80053 COMPREHEN METABOLIC PANEL: CPT | Performed by: EMERGENCY MEDICINE

## 2024-06-09 RX ORDER — ACETAMINOPHEN 500 MG
1000 TABLET ORAL ONCE
Status: COMPLETED | OUTPATIENT
Start: 2024-06-09 | End: 2024-06-09

## 2024-06-10 ENCOUNTER — APPOINTMENT (OUTPATIENT)
Dept: CT IMAGING | Facility: HOSPITAL | Age: 84
End: 2024-06-10
Attending: EMERGENCY MEDICINE
Payer: MEDICARE

## 2024-06-10 VITALS
OXYGEN SATURATION: 98 % | SYSTOLIC BLOOD PRESSURE: 142 MMHG | HEIGHT: 66 IN | HEART RATE: 75 BPM | RESPIRATION RATE: 16 BRPM | TEMPERATURE: 97 F | BODY MASS INDEX: 19.29 KG/M2 | WEIGHT: 120 LBS | DIASTOLIC BLOOD PRESSURE: 68 MMHG

## 2024-06-10 LAB
ALBUMIN SERPL-MCNC: 3.6 G/DL (ref 3.4–5)
ALBUMIN/GLOB SERPL: 1.1 {RATIO} (ref 1–2)
ALP LIVER SERPL-CCNC: 62 U/L
ALT SERPL-CCNC: 15 U/L
ANION GAP SERPL CALC-SCNC: 6 MMOL/L (ref 0–18)
AST SERPL-CCNC: 19 U/L (ref 15–37)
BILIRUB SERPL-MCNC: 0.7 MG/DL (ref 0.1–2)
BUN BLD-MCNC: 10 MG/DL (ref 9–23)
CALCIUM BLD-MCNC: 8.8 MG/DL (ref 8.5–10.1)
CHLORIDE SERPL-SCNC: 112 MMOL/L (ref 98–112)
CO2 SERPL-SCNC: 22 MMOL/L (ref 21–32)
CREAT BLD-MCNC: 0.59 MG/DL
EGFRCR SERPLBLD CKD-EPI 2021: 88 ML/MIN/1.73M2 (ref 60–?)
GLOBULIN PLAS-MCNC: 3.2 G/DL (ref 2.8–4.4)
GLUCOSE BLD-MCNC: 97 MG/DL (ref 70–99)
OSMOLALITY SERPL CALC.SUM OF ELEC: 289 MOSM/KG (ref 275–295)
POTASSIUM SERPL-SCNC: 3.6 MMOL/L (ref 3.5–5.1)
PROT SERPL-MCNC: 6.8 G/DL (ref 6.4–8.2)
SODIUM SERPL-SCNC: 140 MMOL/L (ref 136–145)

## 2024-06-10 PROCEDURE — 70450 CT HEAD/BRAIN W/O DYE: CPT | Performed by: EMERGENCY MEDICINE

## 2024-06-10 NOTE — ED QUICK NOTES
Spoke to pt's daughter over the phone. Discharge instructions provided.    Pt's daughter will  pt in the next 15-20 minutes.    Ambulance services canceled at this time.

## 2024-06-10 NOTE — ED PROVIDER NOTES
Patient Seen in: [facility] Emergency Department      History     Chief Complaint   Patient presents with    Headache     Stated Complaint: Headache to back of head 8/10 for past 2hr    Subjective:   83-year-old female presents emergency room for headache.  Patient woke.  Patient did not take any Tylenol.  She is ANO x 2 at which is her baseline.  Patient does have mildly elevated blood pressure.  She states that she normally takes Tylenol for headaches.  Patient has a history of CVA.    The history is provided by the patient and the EMS personnel.           Objective:   Past Medical History:    Dementia (HCC)    Malignant neoplasm of ascending colon (HCC)    Other and unspecified hyperlipidemia    Stroke (HCC)              Past Surgical History:   Procedure Laterality Date    Colonoscopy  2012    Repeat 3 years - Colon Ca; dx in [place]    Colonoscopy & polypectomy  2014    Other surgical history  2010    colon surgery    Other surgical history      right knee surgery - torn meniscus, [place]                Social History     Socioeconomic History    Marital status:    Tobacco Use    Smoking status: Former     Current packs/day: 0.00     Types: Cigarettes     Quit date: 10/26/1971     Years since quittin.6    Smokeless tobacco: Never   Vaping Use    Vaping status: Never Used   Substance and Sexual Activity    Alcohol use: Yes     Alcohol/week: 11.7 - 17.5 standard drinks of alcohol     Types: 14 - 21 Glasses of wine per week     Comment: 2-3 glasses of wine daily    Drug use: No   Other Topics Concern    Caffeine Concern Yes     Comment: 3 cups of tea daily    Exercise Yes     Comment: walking daily    Seat Belt Yes    Self-Exams No     Social Determinants of Health     Food Insecurity: Low Risk  (2022)    [facility]    Food Insecurity     Have there been times that your food ran out, and you didn't have money to get  more?: No     Are there times that you worry that this might happen?: No   Transportation Needs: Low Risk  (12/20/2022)    [facility]    Transportation Needs     Has lack of transportation kept you from medical appointments, meetings, work, or from getting things needed for daily living: No              Review of Systems   Neurological:  Positive for headaches.       Positive for stated complaint: Headache to back of head 8/10 for past 2hr  Other systems are as noted in HPI.  Constitutional and vital signs reviewed.      All other systems reviewed and negative except as noted above.    Physical Exam     ED Triage Vitals   BP 06/09/24 2328 (!) 169/82   Pulse 06/09/24 2327 65   Resp 06/09/24 2327 19   Temp 06/09/24 2327 96.8 °F (36 °C)   Temp src 06/09/24 2327 Temporal   SpO2 06/09/24 2327 96 %   O2 Device 06/09/24 2327 None (Room air)       Current Vitals:   Vital Signs  BP: 139/77  Pulse: 73  Resp: 14  Temp: 96.8 °F (36 °C)  Temp src: Temporal  MAP (mmHg): 95    Oxygen Therapy  SpO2: 97 %  O2 Device: None (Room air)            Physical Exam  Vitals and nursing note reviewed.   Constitutional:       General: She is not in acute distress.     Appearance: Normal appearance. She is normal weight. She is not toxic-appearing.   HENT:      Head: Normocephalic and atraumatic.   Eyes:      Extraocular Movements: Extraocular movements intact.      Pupils: Pupils are equal, round, and reactive to light.   Cardiovascular:      Rate and Rhythm: Normal rate.      Pulses: Normal pulses.   Pulmonary:      Effort: Pulmonary effort is normal. No respiratory distress.      Breath sounds: Normal breath sounds. No wheezing.   Abdominal:      General: Bowel sounds are normal.      Palpations: Abdomen is soft.   Skin:     General: Skin is warm.      Capillary Refill: Capillary refill takes less than 2 seconds.   Neurological:      General: No focal deficit present.       Mental Status: She is alert and oriented to person, place, and time.      Cranial Nerves: No cranial nerve deficit.      Sensory: No sensory deficit.   Psychiatric:         Mood and Affect: Mood normal.         Behavior: Behavior normal.      Comments: Anxious               ED Course     Labs Reviewed   COMP METABOLIC PANEL (14) - Normal   CBC WITH DIFFERENTIAL WITH PLATELET    Narrative:     The following orders were created for panel order CBC With Differential With Platelet.  Procedure                               Abnormality         Status                     ---------                               -----------         ------                     CBC W/ DIFFERENTIAL[[id number]]                              Final result                 Please view results for these tests on the individual orders.   RAINBOW DRAW LAVENDER   RAINBOW DRAW LIGHT GREEN   RAINBOW DRAW BLUE   CBC W/ DIFFERENTIAL             Scan shows no evidence of acute intracranial abnormality no acute calvarial fracture.         MDM      Social -negative tobacco, negative etoh, negative drugs  Family History-noncontributory  Past Medical History-hyperlipidemia, CVA, dementia    Differential diagnosis before testing included headache, stress    Co-morbidities that add to the complexity of management include: CVA, dementia    Testing ordered during this visit included CT scan of the brain baseline labs    Radiographic images  I personally reviewed the radiographs and my individual interpretation shows no acute process  I also reviewed the official reports that showed Scan shows no evidence of acute intracranial abnormality no acute calvarial fracture.    External chart review showed review of care everywhere in epic system shows patient is not on any blood thinners other than naproxen    History obtained by an independent source included from patient, EMS    Discussion of management with patient    Social determinants of health that affect care include  patient has dementia      Medications Provided: Tylenol    Course of Events during Emergency Room Visit include 83-year-old female who awoke with headache we will get a CT scan of the brain baseline labs give Tylenol for headache.  Patient's blood pressure is mildly elevated we will check it manually if still elevated will give medication to help with blood pressure.  Patient to follow-up with primary care physician    Patient CT shows no acute process she states her headache is gone and she is feeling much better.  Plan for discharge home      Disposition:        Discharge  I have discussed with the patient the results of test, differential diagnosis, treatment plan, warning signs and symptoms which should prompt immediate return.  They expressed understanding of these instructions and agrees to the following plan provided.  They were given written discharge instructions and agrees to return for any concerns and voiced understanding and all questions were answered.                                      Medical Decision Making      Disposition and Plan     Clinical Impression:  1. Tension headache         Disposition:  Discharge  6/10/2024  1:10 am    Follow-up:  Santa Shah MD  [address]  [facility] [id number] [phone]    Schedule an appointment as soon as possible for a visit            Medications Prescribed:  Current Discharge Medication List

## 2024-06-10 NOTE — ED INITIAL ASSESSMENT (HPI)
Pt arrives via EMS from home with c/o headache to posterior head that woke her up. Headache for the past 2 hrs. Denies vomiting.  Denies visual changes. Pt at her baseline per report. No slurred speech, facial droop noted.   ER MD at bedside.    Pt denies in juries/falls

## 2024-06-10 NOTE — ED INITIAL ASSESSMENT (HPI)
Pt arrives via EMS from home with c/o headache to posterior head that woke her up. Headache for the past 2 hrs. Denies vomiting.  Denies visual changes. Pt at her baseline per report. No slurred speech, facial droop noted.   ER MD at bedside.

## 2024-06-25 DIAGNOSIS — E78.00 PURE HYPERCHOLESTEROLEMIA: ICD-10-CM

## 2024-06-26 RX ORDER — MELATONIN 10 MG
1 CAPSULE ORAL NIGHTLY
Qty: 16 CAPSULE | Status: SHIPPED | OUTPATIENT
Start: 2024-06-26

## 2024-06-26 RX ORDER — SIMVASTATIN 20 MG
TABLET ORAL
Qty: 16 TABLET | Status: SHIPPED | OUTPATIENT
Start: 2024-06-26

## 2024-06-26 RX ORDER — MEMANTINE HYDROCHLORIDE 7 MG/1
7 CAPSULE, EXTENDED RELEASE ORAL DAILY
Qty: 16 CAPSULE | Status: SHIPPED | OUTPATIENT
Start: 2024-06-26

## 2024-06-26 NOTE — TELEPHONE ENCOUNTER
Requested Prescriptions     Pending Prescriptions Disp Refills    MELATONIN 10 MG Oral Cap [Pharmacy Med Name: Melatonin 10 MG Capsule] 16 capsule PRN     Sig: TAKE 1 CAPSULE BY MOUTH AT BEDTIME    MEMANTINE ER 7 MG Oral Capsule SR 24 Hr [Pharmacy Med Name: Memantine HCl ER 7 MG Capsule extended release 24 hr] 16 capsule PRN     Sig: TAKE 1 CAPSULE BY MOUTH ONCE DAILY    simvastatin 20 MG Oral Tab [Pharmacy Med Name: Simvastatin 20 MG Tablet] 16 tablet PRN     Sig: TAKE 1 TABLET BY MOUTH AT BEDTIME     LOV 5/24/2024     Patient was asked to follow-up in: Follow-up not documented in note    Appointment scheduled: Visit date not found     Medication Not refilled per protocol.    Cholesterol Medication Protocol [id number]/25/2024 03:08 PM   Protocol Details Lipid panel within past 12 months    ALT < 80    ALT resulted within past year    In person appointment or virtual visit in the past 12 mos or appointment in next 3 mos

## 2024-07-12 DIAGNOSIS — E78.00 PURE HYPERCHOLESTEROLEMIA: ICD-10-CM

## 2024-07-12 RX ORDER — SIMVASTATIN 20 MG
TABLET ORAL
Qty: 90 TABLET | Refills: 3 | Status: SHIPPED | OUTPATIENT
Start: 2024-07-12

## 2024-12-14 ENCOUNTER — HOME HEALTH CHARGES (OUTPATIENT)
Dept: FAMILY MEDICINE CLINIC | Facility: CLINIC | Age: 84
End: 2024-12-14

## 2024-12-14 DIAGNOSIS — Z71.89 COUNSELING REGARDING ADVANCE CARE PLANNING AND GOALS OF CARE: Primary | ICD-10-CM

## 2025-04-03 ENCOUNTER — TELEPHONE (OUTPATIENT)
Dept: FAMILY MEDICINE CLINIC | Facility: CLINIC | Age: 85
End: 2025-04-03

## 2025-04-03 RX ORDER — LOPERAMIDE HYDROCHLORIDE 2 MG/1
2 TABLET ORAL 3 TIMES DAILY PRN
Qty: 20 TABLET | Refills: 0 | Status: SHIPPED | OUTPATIENT
Start: 2025-04-03 | End: 2025-04-03

## 2025-04-03 RX ORDER — LOPERAMIDE HYDROCHLORIDE 2 MG/1
2 TABLET ORAL 3 TIMES DAILY PRN
Qty: 20 TABLET | Refills: 0 | Status: SHIPPED | OUTPATIENT
Start: 2025-04-03

## 2025-04-14 ENCOUNTER — TELEPHONE (OUTPATIENT)
Dept: FAMILY MEDICINE CLINIC | Facility: CLINIC | Age: 85
End: 2025-04-14

## 2025-04-14 NOTE — TELEPHONE ENCOUNTER
Spoke to [facility]:  Reference # [id number]     Verbal given for 3 times a day max dose 6 mg

## 2025-04-14 NOTE — TELEPHONE ENCOUNTER
[facility] is requesting clarification on medication.    Reference # [id number]    Questions about maximum daily dose.  Loperamide HCl 2 MG Oral Tab     Please advise.

## 2025-04-14 NOTE — TELEPHONE ENCOUNTER
Clarify dose direction for Loperamide.    [facility] states 2 mg 3 times a day equals 6 mg not 8 mg  Do you want three times a day? Or 4 times a day?

## 2025-05-12 ENCOUNTER — OFFICE VISIT (OUTPATIENT)
Dept: FAMILY MEDICINE CLINIC | Facility: CLINIC | Age: 85
End: 2025-05-12
Payer: MEDICARE

## 2025-05-12 VITALS
RESPIRATION RATE: 16 BRPM | TEMPERATURE: 98 F | BODY MASS INDEX: 21 KG/M2 | DIASTOLIC BLOOD PRESSURE: 82 MMHG | HEART RATE: 75 BPM | SYSTOLIC BLOOD PRESSURE: 132 MMHG | WEIGHT: 132 LBS

## 2025-05-12 DIAGNOSIS — Z00.00 ENCOUNTER FOR ANNUAL HEALTH EXAMINATION: Primary | ICD-10-CM

## 2025-05-12 DIAGNOSIS — F03.C0 SEVERE DEMENTIA WITHOUT BEHAVIORAL DISTURBANCE, PSYCHOTIC DISTURBANCE, MOOD DISTURBANCE, OR ANXIETY, UNSPECIFIED DEMENTIA TYPE (HCC): ICD-10-CM

## 2025-05-12 DIAGNOSIS — E78.00 PURE HYPERCHOLESTEROLEMIA: ICD-10-CM

## 2025-05-12 DIAGNOSIS — H61.23 BILATERAL IMPACTED CERUMEN: ICD-10-CM

## 2025-05-12 PROBLEM — R41.3 MEMORY DEFICIT: Status: RESOLVED | Noted: 2018-10-19 | Resolved: 2025-05-12

## 2025-05-12 PROCEDURE — 1159F MED LIST DOCD IN RCRD: CPT | Performed by: FAMILY MEDICINE

## 2025-05-12 PROCEDURE — 99214 OFFICE O/P EST MOD 30 MIN: CPT | Performed by: FAMILY MEDICINE

## 2025-05-12 PROCEDURE — 69210 REMOVE IMPACTED EAR WAX UNI: CPT | Performed by: FAMILY MEDICINE
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
## 2025-05-13 ENCOUNTER — PATIENT MESSAGE (OUTPATIENT)
Dept: FAMILY MEDICINE CLINIC | Facility: CLINIC | Age: 85
End: 2025-05-13

## 2025-07-11 ENCOUNTER — PATIENT MESSAGE (OUTPATIENT)
Dept: FAMILY MEDICINE CLINIC | Facility: CLINIC | Age: 85
End: 2025-07-11

## 2025-08-05 ENCOUNTER — TELEPHONE (OUTPATIENT)
Dept: FAMILY MEDICINE CLINIC | Facility: CLINIC | Age: 85
End: 2025-08-05

## 2025-08-27 ENCOUNTER — TELEPHONE (OUTPATIENT)
Dept: FAMILY MEDICINE CLINIC | Facility: CLINIC | Age: 85
End: 2025-08-27

## 2025-08-27 DIAGNOSIS — M79.604 PAIN OF RIGHT LOWER EXTREMITY: Primary | ICD-10-CM

## (undated) DIAGNOSIS — E78.00 PURE HYPERCHOLESTEROLEMIA: ICD-10-CM

## (undated) NOTE — IP AVS SNAPSHOT
[address]            (For Outpatient Use Only) Initial Admit Date: 2022   Inpt/Obs Admit Date: Inpt: 22 / Obs: N/A   Discharge Date:    Bary Leak:  [de-identified]   MRN: [de-identified]   CSN: [id number]   CEID: [id number]        ENCOUNTER  Patient Class: Inpatient Admitting Provider: Kristian Mccullough MD Unit: [facility] Service: Cardiac Telemetry Attending Provider: Demetri Vazquez DO   Bed: 8090-P   Visit Type:   Referring Physician: No ref. provider found Billing Flag:    Admit Diagnosis: Acute CVA (cerebrovascular accident) [facility]) [I63.9]      PATIENT  Legal Name:   Ilia Rich    Legal Sex: Female  Gender ID:              [id number] [facility] Name:    PCP:  Jacek Vaughan MD Home: [phone]   Address:  [place] [address] : 1940 (82 yrs) Mobile: [phone]         City/State/Zip: [address] [place] [address], [address] [facility] Marital:  Language: [place] park: [place] SSN4: [id number] Restorationism: [address] Not Rochele Sonali*     Race: White Ethnicity: Non  Or [address] [place] [address]   Name Relationship Legal Guardian? Home Phone Work Phone Mobile Phone   1. OsmelnlgalileoPrimo  2.  Sissy Home  Daughter    [phone]     GUARANTOR  Guarantor: [address] [place] [address] : 1940 Home Phone: [phone]   Address: [address] [place] [address]  Sex: Female Work Phone:    City/State/Zip: [address] [place] [address], [address]   Rel. to Patient: Self Guarantor ID: [id number]   [address]   Employer:  Status: RETIRED     COVERAGE  PRIMARY INSURANCE   Payor: CORINE CASTANEDA Plan: [place]   Group Number: [id number] Insurance Type: [id number] Name: Xena Cerna : 1940   Subscriber ID: [id number] Pt Rel to Subscriber: Self   SECONDARY INSURANCE   Payor:  Plan:    Group Number:  Insurance Type:    Subscriber Name:  Subscriber :    Subscriber ID:  Pt Rel to Subscriber:    TERTIARY INSURANCE   Payor:  Plan:    Group Number: Insurance Type:    Subscriber Name:  Subscriber :    Subscriber ID:  Pt Rel to Subscriber:    Hospital Account Financial Class: Medicare Advantage    2022

## (undated) NOTE — LETTER
18    Patient: Ailyn Palmer  : 1940 Visit date: 2018    Dear  Dr. Mark Stallings MD,    Thank you for referring Ailyn Palmer to my practice. Please find my assessment and plan below.                 History of Present Illness   The patien